# Patient Record
Sex: MALE | Race: OTHER
[De-identification: names, ages, dates, MRNs, and addresses within clinical notes are randomized per-mention and may not be internally consistent; named-entity substitution may affect disease eponyms.]

---

## 2018-08-25 ENCOUNTER — HOSPITAL ENCOUNTER (INPATIENT)
Dept: HOSPITAL 61 - ER | Age: 43
LOS: 5 days | Discharge: HOME | DRG: 871 | End: 2018-08-30
Attending: INTERNAL MEDICINE | Admitting: INTERNAL MEDICINE
Payer: MEDICARE

## 2018-08-25 VITALS — DIASTOLIC BLOOD PRESSURE: 63 MMHG | SYSTOLIC BLOOD PRESSURE: 101 MMHG

## 2018-08-25 VITALS — BODY MASS INDEX: 34.49 KG/M2 | HEIGHT: 64 IN | WEIGHT: 202.01 LBS

## 2018-08-25 VITALS — SYSTOLIC BLOOD PRESSURE: 97 MMHG | DIASTOLIC BLOOD PRESSURE: 73 MMHG

## 2018-08-25 VITALS — SYSTOLIC BLOOD PRESSURE: 113 MMHG | DIASTOLIC BLOOD PRESSURE: 76 MMHG

## 2018-08-25 VITALS — DIASTOLIC BLOOD PRESSURE: 69 MMHG | SYSTOLIC BLOOD PRESSURE: 103 MMHG

## 2018-08-25 VITALS — DIASTOLIC BLOOD PRESSURE: 64 MMHG | SYSTOLIC BLOOD PRESSURE: 119 MMHG

## 2018-08-25 VITALS — SYSTOLIC BLOOD PRESSURE: 106 MMHG | DIASTOLIC BLOOD PRESSURE: 63 MMHG

## 2018-08-25 VITALS — SYSTOLIC BLOOD PRESSURE: 106 MMHG | DIASTOLIC BLOOD PRESSURE: 74 MMHG

## 2018-08-25 VITALS — DIASTOLIC BLOOD PRESSURE: 90 MMHG | SYSTOLIC BLOOD PRESSURE: 119 MMHG

## 2018-08-25 VITALS — DIASTOLIC BLOOD PRESSURE: 76 MMHG | SYSTOLIC BLOOD PRESSURE: 120 MMHG

## 2018-08-25 DIAGNOSIS — F20.9: ICD-10-CM

## 2018-08-25 DIAGNOSIS — G92: ICD-10-CM

## 2018-08-25 DIAGNOSIS — E87.6: ICD-10-CM

## 2018-08-25 DIAGNOSIS — E11.65: ICD-10-CM

## 2018-08-25 DIAGNOSIS — F17.210: ICD-10-CM

## 2018-08-25 DIAGNOSIS — N17.9: ICD-10-CM

## 2018-08-25 DIAGNOSIS — G89.29: ICD-10-CM
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
## 2018-08-25 DIAGNOSIS — Q90.9: ICD-10-CM

## 2018-08-25 DIAGNOSIS — T67.5XXA: ICD-10-CM

## 2018-08-25 DIAGNOSIS — E83.42: ICD-10-CM

## 2018-08-25 DIAGNOSIS — A41.9: Primary | ICD-10-CM

## 2018-08-25 DIAGNOSIS — Z82.49: ICD-10-CM

## 2018-08-25 DIAGNOSIS — R50.9: ICD-10-CM

## 2018-08-25 DIAGNOSIS — Z79.4: ICD-10-CM

## 2018-08-25 DIAGNOSIS — F15.10: ICD-10-CM

## 2018-08-25 DIAGNOSIS — N17.0: ICD-10-CM

## 2018-08-25 DIAGNOSIS — E87.1: ICD-10-CM

## 2018-08-25 DIAGNOSIS — M62.82: ICD-10-CM

## 2018-08-25 LAB
% BANDS: 8 % (ref 0–9)
% LYMPHS: 4 % (ref 24–48)
% MONOS: 4 % (ref 0–10)
% SEGS: 84 % (ref 35–66)
ACETAMIN: < 2 MCG/ML (ref 10–30)
ALBUMIN SERPL-MCNC: 4.1 G/DL (ref 3.4–5)
ALBUMIN/GLOB SERPL: 1.2 {RATIO} (ref 1–1.7)
ALP SERPL-CCNC: 172 U/L (ref 46–116)
ALT SERPL-CCNC: 7 U/L (ref 16–63)
AMPHETAMINE/METHAMPHETAMINE: (no result)
ANION GAP SERPL CALC-SCNC: 16 MMOL/L (ref 6–14)
ANION GAP SERPL CALC-SCNC: 16 MMOL/L (ref 6–14)
APTT PPP: YELLOW S
AST SERPL-CCNC: 42 U/L (ref 15–37)
BACTERIA #/AREA URNS HPF: (no result) /HPF
BARBITURATES UR-MCNC: (no result) UG/ML
BASE EXCESS ABG: -9 MMOL/L (ref -3–3)
BASOPHILS # BLD AUTO: 0 X10^3/UL (ref 0–0.2)
BASOPHILS # BLD AUTO: 0 X10^3/UL (ref 0–0.2)
BASOPHILS NFR BLD: 0 % (ref 0–3)
BASOPHILS NFR BLD: 0 % (ref 0–3)
BENZODIAZ UR-MCNC: (no result) UG/L
BILIRUB SERPL-MCNC: 0.3 MG/DL (ref 0.2–1)
BILIRUB UR QL STRIP: (no result)
BUN SERPL-MCNC: 41 MG/DL (ref 8–26)
BUN SERPL-MCNC: 48 MG/DL (ref 8–26)
BUN/CREAT SERPL: 17 (ref 6–20)
CALCIUM SERPL-MCNC: 8.3 MG/DL (ref 8.5–10.1)
CALCIUM SERPL-MCNC: 9.3 MG/DL (ref 8.5–10.1)
CANNABINOIDS UR-MCNC: (no result) UG/L
CHLORIDE SERPL-SCNC: 103 MMOL/L (ref 98–107)
CHLORIDE SERPL-SCNC: 105 MMOL/L (ref 98–107)
CK SERPL-CCNC: 1170 U/L (ref 39–308)
CO2 SERPL-SCNC: 15 MMOL/L (ref 21–32)
CO2 SERPL-SCNC: 16 MMOL/L (ref 21–32)
COCAINE UR-MCNC: (no result) NG/ML
CREAT SERPL-MCNC: 1.7 MG/DL (ref 0.7–1.3)
CREAT SERPL-MCNC: 2.9 MG/DL (ref 0.7–1.3)
EOSINOPHIL NFR BLD: 0 % (ref 0–3)
EOSINOPHIL NFR BLD: 0 % (ref 0–3)
EOSINOPHIL NFR BLD: 0 X10^3/UL (ref 0–0.7)
EOSINOPHIL NFR BLD: 0 X10^3/UL (ref 0–0.7)
ERYTHROCYTE [DISTWIDTH] IN BLOOD BY AUTOMATED COUNT: 15.4 % (ref 11.5–14.5)
ERYTHROCYTE [DISTWIDTH] IN BLOOD BY AUTOMATED COUNT: 15.9 % (ref 11.5–14.5)
ETHANOL SERPL-MCNC: < 10 MG/DL (ref 0–10)
FIBRINOGEN PPP-MCNC: CLEAR MG/DL
GFR SERPLBLD BASED ON 1.73 SQ M-ARVRAT: 23.9 ML/MIN
GFR SERPLBLD BASED ON 1.73 SQ M-ARVRAT: 44.2 ML/MIN
GLOBULIN SER-MCNC: 3.4 G/DL (ref 2.2–3.8)
GLUCOSE SERPL-MCNC: 342 MG/DL (ref 70–99)
GLUCOSE SERPL-MCNC: 380 MG/DL (ref 70–99)
GRAN CASTS #/AREA URNS LPF: (no result) /HPF
HBA1C MFR BLD: 9.6 % (ref 4.8–5.6)
HCO3 BLDA-SCNC: 13 MMOL/L (ref 21–28)
HCT VFR BLD CALC: 33.8 % (ref 39–53)
HCT VFR BLD CALC: 38.6 % (ref 39–53)
HGB BLD-MCNC: 11.4 G/DL (ref 13–17.5)
HGB BLD-MCNC: 13.3 G/DL (ref 13–17.5)
HYALINE CASTS #/AREA URNS LPF: (no result) /HPF
INSPIRATION SETTING TIME VENT: 21
LYMPHOCYTES # BLD: 0.9 X10^3/UL (ref 1–4.8)
LYMPHOCYTES # BLD: 1 X10^3/UL (ref 1–4.8)
LYMPHOCYTES NFR BLD AUTO: 5 % (ref 24–48)
LYMPHOCYTES NFR BLD AUTO: 7 % (ref 24–48)
MAGNESIUM SERPL-MCNC: 1.7 MG/DL (ref 1.8–2.4)
MCH RBC QN AUTO: 28 PG (ref 25–35)
MCH RBC QN AUTO: 29 PG (ref 25–35)
MCHC RBC AUTO-ENTMCNC: 34 G/DL (ref 31–37)
MCHC RBC AUTO-ENTMCNC: 34 G/DL (ref 31–37)
MCV RBC AUTO: 83 FL (ref 79–100)
MCV RBC AUTO: 84 FL (ref 79–100)
METHADONE SERPL-MCNC: (no result) NG/ML
MONO #: 0.9 X10^3/UL (ref 0–1.1)
MONO #: 1.1 X10^3/UL (ref 0–1.1)
MONOCYTES NFR BLD: 6 % (ref 0–9)
MONOCYTES NFR BLD: 8 % (ref 0–9)
NEUT #: 12 X10^3UL (ref 1.8–7.7)
NEUT #: 14.6 X10^3UL (ref 1.8–7.7)
NEUTROPHILS NFR BLD AUTO: 85 % (ref 31–73)
NEUTROPHILS NFR BLD AUTO: 89 % (ref 31–73)
NITRITE UR QL STRIP: NEGATIVE
OPIATES UR-MCNC: (no result) NG/ML
PCO2 BLDA: 21 MMHG (ref 35–46)
PCP SERPL-MCNC: (no result) MG/DL
PH UR STRIP: 5 [PH]
PLATELET # BLD AUTO: 190 X10^3/UL (ref 140–400)
PLATELET # BLD AUTO: 250 X10^3/UL (ref 140–400)
PLATELET # BLD EST: ADEQUATE 10*3/UL
PO2 BLDA: 59 MMHG (ref 75–108)
POLYCHROMASIA BLD QL SMEAR: SLIGHT
POTASSIUM SERPL-SCNC: 3.4 MMOL/L (ref 3.5–5.1)
POTASSIUM SERPL-SCNC: 4.5 MMOL/L (ref 3.5–5.1)
PROT SERPL-MCNC: 7.5 G/DL (ref 6.4–8.2)
PROT UR STRIP-MCNC: >=300 MG/DL
PROTHROMBIN TIME: 14.5 SEC (ref 11.7–14)
RBC # BLD AUTO: 4.01 X10^6/UL (ref 4.3–5.7)
RBC # BLD AUTO: 4.63 X10^6/UL (ref 4.3–5.7)
SALIC: 6.6 MG/DL (ref 2.8–20)
SAO2 % BLDA: 89 % (ref 92–99)
SODIUM SERPL-SCNC: 134 MMOL/L (ref 136–145)
SODIUM SERPL-SCNC: 137 MMOL/L (ref 136–145)
SQUAMOUS #/AREA URNS LPF: (no result) /LPF
UROBILINOGEN UR-MCNC: 0.2 MG/DL
WBC # BLD AUTO: 14 X10^3/UL (ref 4–11)
WBC # BLD AUTO: 16.5 X10^3/UL (ref 4–11)
WBC #/AREA URNS HPF: (no result) /HPF (ref 0–4)

## 2018-08-25 PROCEDURE — 80053 COMPREHEN METABOLIC PANEL: CPT

## 2018-08-25 PROCEDURE — 36415 COLL VENOUS BLD VENIPUNCTURE: CPT

## 2018-08-25 PROCEDURE — 93005 ELECTROCARDIOGRAM TRACING: CPT

## 2018-08-25 PROCEDURE — 85610 PROTHROMBIN TIME: CPT

## 2018-08-25 PROCEDURE — 82553 CREATINE MB FRACTION: CPT

## 2018-08-25 PROCEDURE — G0479 DRUG TEST PRESUMP NOT OPT: HCPCS

## 2018-08-25 PROCEDURE — 82010 KETONE BODYS QUAN: CPT

## 2018-08-25 PROCEDURE — 80329 ANALGESICS NON-OPIOID 1 OR 2: CPT

## 2018-08-25 PROCEDURE — 82805 BLOOD GASES W/O2 SATURATION: CPT

## 2018-08-25 PROCEDURE — 80048 BASIC METABOLIC PNL TOTAL CA: CPT

## 2018-08-25 PROCEDURE — 87205 SMEAR GRAM STAIN: CPT

## 2018-08-25 PROCEDURE — 82565 ASSAY OF CREATININE: CPT

## 2018-08-25 PROCEDURE — 82550 ASSAY OF CK (CPK): CPT

## 2018-08-25 PROCEDURE — 87186 SC STD MICRODIL/AGAR DIL: CPT

## 2018-08-25 PROCEDURE — 36600 WITHDRAWAL OF ARTERIAL BLOOD: CPT

## 2018-08-25 PROCEDURE — 82962 GLUCOSE BLOOD TEST: CPT

## 2018-08-25 PROCEDURE — 85651 RBC SED RATE NONAUTOMATED: CPT

## 2018-08-25 PROCEDURE — 87641 MR-STAPH DNA AMP PROBE: CPT

## 2018-08-25 PROCEDURE — 85025 COMPLETE CBC W/AUTO DIFF WBC: CPT

## 2018-08-25 PROCEDURE — 83735 ASSAY OF MAGNESIUM: CPT

## 2018-08-25 PROCEDURE — 84484 ASSAY OF TROPONIN QUANT: CPT

## 2018-08-25 PROCEDURE — 96374 THER/PROPH/DIAG INJ IV PUSH: CPT

## 2018-08-25 PROCEDURE — G6039 ASSAY OF ACETAMINOPHEN: HCPCS

## 2018-08-25 PROCEDURE — 83036 HEMOGLOBIN GLYCOSYLATED A1C: CPT

## 2018-08-25 PROCEDURE — 80202 ASSAY OF VANCOMYCIN: CPT

## 2018-08-25 PROCEDURE — 70450 CT HEAD/BRAIN W/O DYE: CPT

## 2018-08-25 PROCEDURE — 80307 DRUG TEST PRSMV CHEM ANLYZR: CPT

## 2018-08-25 PROCEDURE — 87040 BLOOD CULTURE FOR BACTERIA: CPT

## 2018-08-25 PROCEDURE — 85007 BL SMEAR W/DIFF WBC COUNT: CPT

## 2018-08-25 PROCEDURE — 96361 HYDRATE IV INFUSION ADD-ON: CPT

## 2018-08-25 PROCEDURE — 83605 ASSAY OF LACTIC ACID: CPT

## 2018-08-25 PROCEDURE — 93306 TTE W/DOPPLER COMPLETE: CPT

## 2018-08-25 PROCEDURE — 96376 TX/PRO/DX INJ SAME DRUG ADON: CPT

## 2018-08-25 PROCEDURE — 81001 URINALYSIS AUTO W/SCOPE: CPT

## 2018-08-25 RX ADMIN — NICOTINE PRN PATCH: 21 PATCH, EXTENDED RELEASE TOPICAL at 12:43

## 2018-08-25 RX ADMIN — BACITRACIN SCH MLS/HR: 5000 INJECTION, POWDER, FOR SOLUTION INTRAMUSCULAR at 20:56

## 2018-08-25 RX ADMIN — INSULIN GLARGINE SCH UNITS: 100 INJECTION, SOLUTION SUBCUTANEOUS at 21:08

## 2018-08-25 RX ADMIN — INSULIN LISPRO SCH UNITS: 100 INJECTION, SOLUTION INTRAVENOUS; SUBCUTANEOUS at 17:23

## 2018-08-25 RX ADMIN — HEPARIN SODIUM SCH UNIT: 5000 INJECTION, SOLUTION INTRAVENOUS; SUBCUTANEOUS at 15:21

## 2018-08-25 RX ADMIN — HEPARIN SODIUM SCH UNIT: 5000 INJECTION, SOLUTION INTRAVENOUS; SUBCUTANEOUS at 22:08

## 2018-08-25 RX ADMIN — BACITRACIN SCH MLS/HR: 5000 INJECTION, POWDER, FOR SOLUTION INTRAMUSCULAR at 05:18

## 2018-08-25 RX ADMIN — BACITRACIN SCH MLS/HR: 5000 INJECTION, POWDER, FOR SOLUTION INTRAMUSCULAR at 07:35

## 2018-08-25 RX ADMIN — INSULIN LISPRO SCH UNITS: 100 INJECTION, SOLUTION INTRAVENOUS; SUBCUTANEOUS at 12:50

## 2018-08-25 RX ADMIN — BACITRACIN SCH MLS/HR: 5000 INJECTION, POWDER, FOR SOLUTION INTRAMUSCULAR at 13:18

## 2018-08-25 RX ADMIN — MORPHINE SULFATE PRN MG: 2 INJECTION, SOLUTION INTRAMUSCULAR; INTRAVENOUS at 21:02

## 2018-08-25 RX ADMIN — BACITRACIN SCH MLS/HR: 5000 INJECTION, POWDER, FOR SOLUTION INTRAMUSCULAR at 17:18

## 2018-08-25 NOTE — PHYS DOC
Past Medical History


Past Medical History:  Diabetes-Type II


Alcohol Use:  Occasionally


Drug Use:  Methamphetamine





Adult General


Chief Complaint


Chief Complaint:  SUBSTANCE ABUSE





HPI


HPI





Patient is a 42-year-old male who presents via EMS after reportedly being found 

out walking around for undetermined amount of time and very confused. Patient 

had admitted to EMS that he had used meth earlier this evening. EMS reports 

that patient confused and has temperature of greater than 105 tympanic. Patient 

denies any chest pain or shortness breath. He does admit to foot pain. Patient 

apparently has been walking around barefoot for undetermined period of time. 

Additional history is limited as patient is very poor historian.





Review of Systems


Review of Systems





Constitutional: Positive fever[]


Respiratory: Denies cough or shortness of breath []


Cardiovascular: Denies chest pain[]


GI: Denies abdominal pain []


Musculoskeletal: Complains of bilateral foot pain[]





A complete review of systems is limited due to patient being very poor 

historian.





Current Medications


Current Medications





Current Medications








 Medications


  (Trade)  Dose


 Ordered  Sig/Roxy  Start Time


 Stop Time Status Last Admin


Dose Admin


 


 Acetaminophen


  (Tylenol)  1,000 mg  1X  ONCE  8/25/18 05:00


 8/25/18 05:01 DC 8/25/18 05:00


1,000 MG


 


 Lorazepam


  (Ativan)  2 mg  1X  ONCE  8/25/18 05:00


 8/25/18 05:01 DC  





 


 Sodium Chloride  1,000 ml @ 


 1,000 mls/hr  Q1H  8/25/18 04:00


 8/25/18 04:59 DC 8/25/18 04:06


1,000 MLS/HR











Allergies


Allergies





Allergies








Coded Allergies Type Severity Reaction Last Updated Verified


 


  No Known Drug Allergies    8/25/18 No











Physical Exam


Physical Exam





Constitutional: Well developed, well nourished, no acute distress. []


HENT: Normocephalic, atraumatic, bilateral external ears normal, oropharynx dry

, no oral exudates, nose normal. []


Eyes: Pupils are dilated 4 mm, reactive to light. [] 


Neck: Normal range of motion, no tenderness, supple, no stridor. [] 


Cardiovascular: Markedly tachycardic rate with regular rhythm[]


Lungs & Thorax:  Bilateral breath sounds clear to auscultation []


Abdomen: Bowel sounds normal, soft, no tenderness. [] 


Skin: Warm, diaphoretic. There is significant sloughing of the epidermis/dermis 

to the plantar aspect of both feet. [] 


Extremities: ROM intact, no edema. [] 


Neurologic: Awake and alert. Normal motor function. Unable to fully assess 

neurological status as patient having difficulty with following instructions. []





Current Patient Data


Vital Signs





 Vital Signs








  Date Time  Temp Pulse Resp B/P (MAP) Pulse Ox O2 Delivery O2 Flow Rate FiO2


 


8/25/18 04:51 102.1       





 102.1       


 


8/25/18 04:10  142 20 108/59 (75) 97 Room Air  








Lab Values





 Laboratory Tests








Test


 8/25/18


03:45 8/25/18


03:55 8/25/18


04:07


 


Urine Collection Type Unknown    


 


Urine Color Yellow    


 


Urine Clarity Clear    


 


Urine pH 5.0    


 


Urine Specific Gravity 1.025    


 


Urine Protein


 >=300 mg/dL


(NEG-TRACE) 


 





 


Urine Glucose (UA)


 >=1000 mg/dL


(NEG) 


 





 


Urine Ketones (Stick)


 Negative mg/dL


(NEG) 


 





 


Urine Blood Trace (NEG)    


 


Urine Nitrite


 Negative (NEG)


 


 





 


Urine Bilirubin Small (NEG)    


 


Urine Urobilinogen Dipstick


 0.2 mg/dL (0.2


mg/dL) 


 





 


Urine Leukocyte Esterase


 Negative (NEG)


 


 





 


Urine RBC


 11-20 /HPF


(0-2) 


 





 


Urine WBC


 1-4 /HPF (0-4)


 


 





 


Urine Squamous Epithelial


Cells Few /LPF  


 


 





 


Urine Bacteria


 Few /HPF


(0-FEW) 


 





 


Urine Hyaline Casts Moderate /HPF    


 


Urine Granular Casts Moderate /HPF    


 


Urine Mucus Slight /LPF    


 


Urine Opiates Screen Neg (NEG)    


 


Urine Methadone Screen Neg (NEG)    


 


Urine Barbiturates Neg (NEG)    


 


Urine Phencyclidine Screen Neg (NEG)    


 


Urine


Amphetamine/Methamphetamine Pos (NEG)  


 


 





 


Urine Benzodiazepines Screen Neg (NEG)    


 


Urine Cocaine Screen Neg (NEG)    


 


Urine Cannabinoids Screen Pos (NEG)    


 


Urine Ethyl Alcohol Neg (NEG)    


 


Glucose (Fingerstick)


 


 367 mg/dL


(70-99)  H 





 


White Blood Count


 


 


 16.5 x10^3/uL


(4.0-11.0)  H


 


Red Blood Count


 


 


 4.63 x10^6/uL


(4.30-5.70)


 


Hemoglobin


 


 


 13.3 g/dL


(13.0-17.5)


 


Hematocrit


 


 


 38.6 %


(39.0-53.0)  L


 


Mean Corpuscular Volume


 


 


 83 fL ()





 


Mean Corpuscular Hemoglobin   29 pg (25-35)  


 


Mean Corpuscular Hemoglobin


Concent 


 


 34 g/dL


(31-37)


 


Red Cell Distribution Width


 


 


 15.4 %


(11.5-14.5)  H


 


Platelet Count


 


 


 250 x10^3/uL


(140-400)


 


Neutrophils (%) (Auto)   89 % (31-73)  H


 


Lymphocytes (%) (Auto)   5 % (24-48)  L


 


Monocytes (%) (Auto)   6 % (0-9)  


 


Eosinophils (%) (Auto)   0 % (0-3)  


 


Basophils (%) (Auto)   0 % (0-3)  


 


Neutrophils # (Auto)


 


 


 14.6 x10^3uL


(1.8-7.7)  H


 


Lymphocytes # (Auto)


 


 


 0.9 x10^3/uL


(1.0-4.8)  L


 


Monocytes # (Auto)


 


 


 0.9 x10^3/uL


(0.0-1.1)


 


Eosinophils # (Auto)


 


 


 0.0 x10^3/uL


(0.0-0.7)


 


Basophils # (Auto)


 


 


 0.0 x10^3/uL


(0.0-0.2)


 


Platelet Estimate   Pending  


 


Prothrombin Time


 


 


 14.5 SEC


(11.7-14.0)  H


 


Prothrombin Time INR


 


 


 1.2 (0.8-1.1)


H


 


Sodium Level


 


 


 137 mmol/L


(136-145)


 


Potassium Level


 


 


 4.5 mmol/L


(3.5-5.1)


 


Chloride Level


 


 


 105 mmol/L


()


 


Carbon Dioxide Level


 


 


 16 mmol/L


(21-32)  L


 


Anion Gap   16 (6-14)  H


 


Blood Urea Nitrogen


 


 


 48 mg/dL


(8-26)  H


 


Creatinine


 


 


 2.9 mg/dL


(0.7-1.3)  H


 


Estimated GFR


(Cockcroft-Gault) 


 


 23.9  





 


BUN/Creatinine Ratio   17 (6-20)  


 


Glucose Level


 


 


 380 mg/dL


(70-99)  H


 


Calcium Level


 


 


 9.3 mg/dL


(8.5-10.1)


 


Magnesium Level


 


 


 1.7 mg/dL


(1.8-2.4)  L


 


Total Bilirubin


 


 


 0.3 mg/dL


(0.2-1.0)


 


Aspartate Amino Transferase


(AST) 


 


 42 U/L (15-37)


H


 


Alanine Aminotransferase (ALT)


 


 


 7 U/L (16-63)


L


 


Alkaline Phosphatase


 


 


 172 U/L


()  H


 


Creatine Kinase


 


 


 1055 U/L


()  H


 


Total Protein


 


 


 7.5 g/dL


(6.4-8.2)


 


Albumin


 


 


 4.1 g/dL


(3.4-5.0)


 


Albumin/Globulin Ratio   1.2 (1.0-1.7)  


 


Salicylates Level


 


 


 6.6 mg/dL


(2.8-20.0)


 


Salicylate Last Dose Date   unknown  


 


Salicylate Last Dose Time   unknown  


 


Acetaminophen Level


 


 


 < 2.0 mcg/ml


(10-30)  L


 


Acetaminophen Last Dose Date   unknown  


 


Acetaminophen Last Dose Time   unknown  


 


Ethyl Alcohol Level


 


 


 < 10 mg/dL


(0-10)


 


Acetone Level   Pending  





 Laboratory Tests


8/25/18 04:07








 Laboratory Tests


8/25/18 04:07














EKG


EKG


[]


Interpretation Time:


EKG demonstrates sinus tachycardia with rate of 140.





Radiology/Procedures


Radiology/Procedures


[]





Course & Med Decision Making


Course & Med Decision Making


Pertinent Labs and Imaging studies reviewed. (See chart for details)





After arrival, rectal temp was obtained and was found to be 106.1. Cooling 

measures were initiated with ice packs. Patient given normal saline bolus 

patient also given Tylenol for fever. At 5:00 AM, repeat body temperature was 

102.1.





Dragon Disclaimer


Dragon Disclaimer


This electronic medical record was generated, in whole or in part, using a 

voice recognition dictation system.





Departure


Departure


Impression:  


 Primary Impression:  


 Heat exhaustion


 Additional Impressions:  


 Methamphetamine abuse


 Fever 106 degrees F or over


 Acute renal failure


Disposition:  09 ADMITTED AS INPATIENT


Admitting Physician:  Iman Gutierrez


Condition:  IMPROVED





Problem Qualifiers








 Primary Impression:  


 Heat exhaustion


 Encounter type:  initial encounter  Qualified Codes:  T67.5XXA - Heat 

exhaustion, unspecified, initial encounter


 Additional Impressions:  


 Acute renal failure


 Acute renal failure type:  unspecified  Qualified Codes:  N17.9 - Acute kidney 

failure, unspecified








CHICA PATEL Jr. DO Aug 25, 2018 04:35

## 2018-08-25 NOTE — PDOC1
History and Physical


Date of Admission


Date of Admission


8/25/18





Identification/Chief Complaint


Chief Complaint


AMS





Source


Source:  Chart review, Patient





History of Present Illness


History of Present Illness











Patient is a 42-year-old male who presents via EMS after reportedly being found 

out walking around for undetermined amount of time and very confused Yesterday.


pt was admitted to ICU, now he is awake , but the way he talks is not normal, 

as per nurse, pt has down syndrome and schizophrenia, the this is close to his 

baseline.


Pt remembered he took meth yesterday, but said he only took it once a year. He 

was found T 105, LEV, rhabdomyelisis. admitted in ICU with IVF.


He denies cough, sob, chest pain N/V. 


he has bl foot pain, said he fell, but cannot tell me details, bl sole skin 

tear. 


said use insulin for dm at home, also cannot tell the dose.





Past Medical History


Endocrine:  Diabetes





Past Surgical History


Past Surgical History:  No pertinent history





Family History


Family History:  Hypertension





Social History


Smoke:  1 pack per day


ALCOHOL:  rare


Drugs:  Crystal meth





Current Problem List


Problem List


Problems


Medical Problems:


(1) Acute renal failure


Status: Acute  





(2) Fever 106 degrees F or over


Status: Acute  





(3) Heat exhaustion


Status: Acute  





(4) Methamphetamine abuse


Status: Acute  











Current Medications


Current Medications





Current Medications








 Medications


  (Trade)  Dose


 Ordered  Sig/Roxy  Start Time


 Stop Time Status Last Admin


Dose Admin


 


 Acetaminophen


  (Tylenol)  1,000 mg  1X  ONCE  8/25/18 05:00


 8/25/18 05:01 DC 8/25/18 05:00


1,000 MG


 


 Dextrose


  (Dextrose


 50%-Water Syringe)  12.5 gm  PRN Q15MIN  PRN  8/25/18 08:15


     





 


 Insulin Human


 Lispro


  (HumaLOG)  0-9 UNITS  TIDWMEALS  8/25/18 12:00


    8/25/18 12:50


5 UNITS


 


 Lorazepam


  (Ativan)  2 mg  1X  ONCE  8/25/18 05:00


 8/25/18 05:01 DC  





 


 Nicotine


  (Nicoderm Cq


 21mg)  1 patch  PRN DAILY  PRN  8/25/18 11:30


    8/25/18 12:43


1 PATCH


 


 Ondansetron HCl


  (Zofran)  4 mg  PRN Q8HRS  PRN  8/25/18 05:30


 8/26/18 05:29   





 


 Potassium Chloride


  (Klor-Con)  40 meq  1X  ONCE  8/25/18 12:00


 8/25/18 12:01 DC 8/25/18 12:43


40 MEQ


 


 Sodium Chloride  1,000 ml @ 


 1,000 mls/hr  1X  ONCE  8/25/18 08:15


 8/25/18 09:14 DC 8/25/18 08:15


1,000 MLS/HR











Allergies


Allergies





Allergies








Coded Allergies Type Severity Reaction Last Updated Verified


 


  No Known Drug Allergies    8/25/18 No











ROS


Review of System


CONSTITUTIONAL:        No fever or chills


EYES:                          No recent changes


SKIN:               No rash or itching


CARDIOVASCULAR:     No chest pain, syncope, palpitations, or edema


RESPIRATORY:            No SOB or cough


GASTROINTESTINAL:    No nausea, vomiting or abdominal pain


NEUROLOGICAL:          No headaches or weakness


ENDOCRINE:               No cold or heat intolerance


GENITOURINARY:         No urgency or frequency of urination


MUSCULOSKELETAL:   No back pain or joint pain


LYMPHATICS:               No enlarged lymph nodes


PSYCHIATRIC:              No anxiety or depression





Physical Exam


Physical Exam


GEN.:    No apparent distress.  Alert and oriented x3, talks slow and not 

answer questions well. 


HEENT:    Head is normocephalic, atraumatic


NECK:    Supple.  


LUNGS:    Clear to auscultation.


HEART:    RRR, S1, S2 present.  Peripheral pulses intact


ABDOMEN:    Soft, nontender.  Positive bowel sounds.


EXTREMITIES:    Without any cyanosis.    


NEUROLOGIC:     Normal speech, normal tone


PSYCHIATRIC:    Normal affect, normal mood.


SKIN:   bl sole skin tear.





Vitals


Vitals





Vital Signs








  Date Time  Temp Pulse Resp B/P (MAP) Pulse Ox O2 Delivery O2 Flow Rate FiO2


 


8/25/18 11:52 98.1 96 18 119/90 (100) 95 Room Air  





 98.1       


 


8/25/18 06:45       2.0 











Labs


Labs





Laboratory Tests








Test


 8/25/18


03:45 8/25/18


03:55 8/25/18


04:07 8/25/18


05:33


 


Urine Collection Type Unknown    


 


Urine Color Yellow    


 


Urine Clarity Clear    


 


Urine pH 5.0    


 


Urine Specific Gravity 1.025    


 


Urine Protein


 >=300 mg/dL


(NEG-TRACE) 


 


 





 


Urine Glucose (UA)


 >=1000 mg/dL


(NEG) 


 


 





 


Urine Ketones (Stick)


 Negative mg/dL


(NEG) 


 


 





 


Urine Blood Trace (NEG)    


 


Urine Nitrite Negative (NEG)    


 


Urine Bilirubin Small (NEG)    


 


Urine Urobilinogen Dipstick


 0.2 mg/dL (0.2


mg/dL) 


 


 





 


Urine Leukocyte Esterase Negative (NEG)    


 


Urine RBC


 11-20 /HPF


(0-2) 


 


 





 


Urine WBC 1-4 /HPF (0-4)    


 


Urine Squamous Epithelial


Cells Few /LPF 


 


 


 





 


Urine Bacteria


 Few /HPF


(0-FEW) 


 


 





 


Urine Hyaline Casts Moderate /HPF    


 


Urine Granular Casts Moderate /HPF    


 


Urine Mucus Slight /LPF    


 


Urine Opiates Screen Neg (NEG)    


 


Urine Methadone Screen Neg (NEG)    


 


Urine Barbiturates Neg (NEG)    


 


Urine Phencyclidine Screen Neg (NEG)    


 


Urine


Amphetamine/Methamphetamine Pos (NEG) 


 


 


 





 


Urine Benzodiazepines Screen Neg (NEG)    


 


Urine Cocaine Screen Neg (NEG)    


 


Urine Cannabinoids Screen Pos (NEG)    


 


Urine Ethyl Alcohol Neg (NEG)    


 


Glucose (Fingerstick)


 


 367 mg/dL


(70-99) 


 





 


White Blood Count


 


 


 16.5 x10^3/uL


(4.0-11.0) 





 


Red Blood Count


 


 


 4.63 x10^6/uL


(4.30-5.70) 





 


Hemoglobin


 


 


 13.3 g/dL


(13.0-17.5) 





 


Hematocrit


 


 


 38.6 %


(39.0-53.0) 





 


Mean Corpuscular Volume   83 fL ()  


 


Mean Corpuscular Hemoglobin   29 pg (25-35)  


 


Mean Corpuscular Hemoglobin


Concent 


 


 34 g/dL


(31-37) 





 


Red Cell Distribution Width


 


 


 15.4 %


(11.5-14.5) 





 


Platelet Count


 


 


 250 x10^3/uL


(140-400) 





 


Neutrophils (%) (Auto)   89 % (31-73)  


 


Lymphocytes (%) (Auto)   5 % (24-48)  


 


Monocytes (%) (Auto)   6 % (0-9)  


 


Eosinophils (%) (Auto)   0 % (0-3)  


 


Basophils (%) (Auto)   0 % (0-3)  


 


Neutrophils # (Auto)


 


 


 14.6 x10^3uL


(1.8-7.7) 





 


Lymphocytes # (Auto)


 


 


 0.9 x10^3/uL


(1.0-4.8) 





 


Monocytes # (Auto)


 


 


 0.9 x10^3/uL


(0.0-1.1) 





 


Eosinophils # (Auto)


 


 


 0.0 x10^3/uL


(0.0-0.7) 





 


Basophils # (Auto)


 


 


 0.0 x10^3/uL


(0.0-0.2) 





 


Segmented Neutrophils %   84 % (35-66)  


 


Band Neutrophils %   8 % (0-9)  


 


Lymphocytes %   4 % (24-48)  


 


Monocytes %   4 % (0-10)  


 


Platelet Estimate


 


 


 Adequate


(ADEQUATE) 





 


Large Platelets   Few  


 


Polychromasia   Slight  


 


Prothrombin Time


 


 


 14.5 SEC


(11.7-14.0) 





 


Prothromb Time International


Ratio 


 


 1.2 (0.8-1.1) 


 





 


Sodium Level


 


 


 137 mmol/L


(136-145) 





 


Potassium Level


 


 


 4.5 mmol/L


(3.5-5.1) 





 


Chloride Level


 


 


 105 mmol/L


() 





 


Carbon Dioxide Level


 


 


 16 mmol/L


(21-32) 





 


Anion Gap   16 (6-14)  


 


Blood Urea Nitrogen


 


 


 48 mg/dL


(8-26) 





 


Creatinine


 


 


 2.9 mg/dL


(0.7-1.3) 





 


Estimated GFR


(Cockcroft-Gault) 


 


 23.9 


 





 


BUN/Creatinine Ratio   17 (6-20)  


 


Glucose Level


 


 


 380 mg/dL


(70-99) 





 


Lactic Acid Level


 


 


 4.4 mmol/L


(0.4-2.0) 





 


Calcium Level


 


 


 9.3 mg/dL


(8.5-10.1) 





 


Magnesium Level


 


 


 1.7 mg/dL


(1.8-2.4) 





 


Total Bilirubin


 


 


 0.3 mg/dL


(0.2-1.0) 





 


Aspartate Amino Transf


(AST/SGOT) 


 


 42 U/L (15-37) 


 





 


Alanine Aminotransferase


(ALT/SGPT) 


 


 7 U/L (16-63) 


 





 


Alkaline Phosphatase


 


 


 172 U/L


() 





 


Creatine Kinase


 


 


 1170 U/L


() 





 


Creatine Kinase MB (Mass)


 


 


 5.3 ng/mL


(0.0-3.6) 





 


Creatine Kinase MB Relative


Index 


 


 0.5 % (0-4) 


 





 


Troponin I Quantitative


 


 


 0.400 ng/mL


(0.000-0.055) 





 


Total Protein


 


 


 7.5 g/dL


(6.4-8.2) 





 


Albumin


 


 


 4.1 g/dL


(3.4-5.0) 





 


Albumin/Globulin Ratio   1.2 (1.0-1.7)  


 


Salicylates Level


 


 


 6.6 mg/dL


(2.8-20.0) 





 


Salicylate Last Dose Date   unknown  


 


Salicylate Last Dose Time   unknown  


 


Acetaminophen Level


 


 


 < 2.0 mcg/ml


(10-30) 





 


Acetaminophen Last Dose Date   unknown  


 


Acetaminophen Last Dose Time   unknown  


 


Ethyl Alcohol Level


 


 


 < 10 mg/dL


(0-10) 





 


Acetone Level   Neg (NEG)  


 


O2 Saturation    89 % (92-99) 


 


Arterial Blood pH


 


 


 


 7.42


(7.35-7.45)


 


Arterial Blood pCO2 at


Patient Temp 


 


 


 21 mmHg


(35-46)


 


Arterial Blood pO2 at Patient


Temp 


 


 


 59 mmHg


()


 


Arterial Blood HCO3


 


 


 


 13 mmol/L


(21-28)


 


Arterial Blood Base Excess


 


 


 


 -9 mmol/L


(-3-3)


 


FiO2    21.0 


 


Test


 8/25/18


10:55 8/25/18


12:02 


 





 


White Blood Count


 14.0 x10^3/uL


(4.0-11.0) 


 


 





 


Red Blood Count


 4.01 x10^6/uL


(4.30-5.70) 


 


 





 


Hemoglobin


 11.4 g/dL


(13.0-17.5) 


 


 





 


Hematocrit


 33.8 %


(39.0-53.0) 


 


 





 


Mean Corpuscular Volume 84 fL ()    


 


Mean Corpuscular Hemoglobin 28 pg (25-35)    


 


Mean Corpuscular Hemoglobin


Concent 34 g/dL


(31-37) 


 


 





 


Red Cell Distribution Width


 15.9 %


(11.5-14.5) 


 


 





 


Platelet Count


 190 x10^3/uL


(140-400) 


 


 





 


Neutrophils (%) (Auto) 85 % (31-73)    


 


Lymphocytes (%) (Auto) 7 % (24-48)    


 


Monocytes (%) (Auto) 8 % (0-9)    


 


Eosinophils (%) (Auto) 0 % (0-3)    


 


Basophils (%) (Auto) 0 % (0-3)    


 


Neutrophils # (Auto)


 12.0 x10^3uL


(1.8-7.7) 


 


 





 


Lymphocytes # (Auto)


 1.0 x10^3/uL


(1.0-4.8) 


 


 





 


Monocytes # (Auto)


 1.1 x10^3/uL


(0.0-1.1) 


 


 





 


Eosinophils # (Auto)


 0.0 x10^3/uL


(0.0-0.7) 


 


 





 


Basophils # (Auto)


 0.0 x10^3/uL


(0.0-0.2) 


 


 





 


Sodium Level


 134 mmol/L


(136-145) 


 


 





 


Potassium Level


 3.4 mmol/L


(3.5-5.1) 


 


 





 


Chloride Level


 103 mmol/L


() 


 


 





 


Carbon Dioxide Level


 15 mmol/L


(21-32) 


 


 





 


Anion Gap 16 (6-14)    


 


Blood Urea Nitrogen


 41 mg/dL


(8-26) 


 


 





 


Creatinine


 1.7 mg/dL


(0.7-1.3) 


 


 





 


Estimated GFR


(Cockcroft-Gault) 44.2 


 


 


 





 


Glucose Level


 342 mg/dL


(70-99) 


 


 





 


Lactic Acid Level


 2.2 mmol/L


(0.4-2.0) 


 


 





 


Calcium Level


 8.3 mg/dL


(8.5-10.1) 


 


 





 


Glucose (Fingerstick)


 


 281 mg/dL


(70-99) 


 











Laboratory Tests








Test


 8/25/18


03:45 8/25/18


03:55 8/25/18


04:07 8/25/18


05:33


 


Urine Collection Type Unknown    


 


Urine Color Yellow    


 


Urine Clarity Clear    


 


Urine pH 5.0    


 


Urine Specific Gravity 1.025    


 


Urine Protein


 >=300 mg/dL


(NEG-TRACE) 


 


 





 


Urine Glucose (UA)


 >=1000 mg/dL


(NEG) 


 


 





 


Urine Ketones (Stick)


 Negative mg/dL


(NEG) 


 


 





 


Urine Blood Trace (NEG)    


 


Urine Nitrite Negative (NEG)    


 


Urine Bilirubin Small (NEG)    


 


Urine Urobilinogen Dipstick


 0.2 mg/dL (0.2


mg/dL) 


 


 





 


Urine Leukocyte Esterase Negative (NEG)    


 


Urine RBC


 11-20 /HPF


(0-2) 


 


 





 


Urine WBC 1-4 /HPF (0-4)    


 


Urine Squamous Epithelial


Cells Few /LPF 


 


 


 





 


Urine Bacteria


 Few /HPF


(0-FEW) 


 


 





 


Urine Hyaline Casts Moderate /HPF    


 


Urine Granular Casts Moderate /HPF    


 


Urine Mucus Slight /LPF    


 


Urine Opiates Screen Neg (NEG)    


 


Urine Methadone Screen Neg (NEG)    


 


Urine Barbiturates Neg (NEG)    


 


Urine Phencyclidine Screen Neg (NEG)    


 


Urine


Amphetamine/Methamphetamine Pos (NEG) 


 


 


 





 


Urine Benzodiazepines Screen Neg (NEG)    


 


Urine Cocaine Screen Neg (NEG)    


 


Urine Cannabinoids Screen Pos (NEG)    


 


Urine Ethyl Alcohol Neg (NEG)    


 


Glucose (Fingerstick)


 


 367 mg/dL


(70-99) 


 





 


White Blood Count


 


 


 16.5 x10^3/uL


(4.0-11.0) 





 


Red Blood Count


 


 


 4.63 x10^6/uL


(4.30-5.70) 





 


Hemoglobin


 


 


 13.3 g/dL


(13.0-17.5) 





 


Hematocrit


 


 


 38.6 %


(39.0-53.0) 





 


Mean Corpuscular Volume   83 fL ()  


 


Mean Corpuscular Hemoglobin   29 pg (25-35)  


 


Mean Corpuscular Hemoglobin


Concent 


 


 34 g/dL


(31-37) 





 


Red Cell Distribution Width


 


 


 15.4 %


(11.5-14.5) 





 


Platelet Count


 


 


 250 x10^3/uL


(140-400) 





 


Neutrophils (%) (Auto)   89 % (31-73)  


 


Lymphocytes (%) (Auto)   5 % (24-48)  


 


Monocytes (%) (Auto)   6 % (0-9)  


 


Eosinophils (%) (Auto)   0 % (0-3)  


 


Basophils (%) (Auto)   0 % (0-3)  


 


Neutrophils # (Auto)


 


 


 14.6 x10^3uL


(1.8-7.7) 





 


Lymphocytes # (Auto)


 


 


 0.9 x10^3/uL


(1.0-4.8) 





 


Monocytes # (Auto)


 


 


 0.9 x10^3/uL


(0.0-1.1) 





 


Eosinophils # (Auto)


 


 


 0.0 x10^3/uL


(0.0-0.7) 





 


Basophils # (Auto)


 


 


 0.0 x10^3/uL


(0.0-0.2) 





 


Segmented Neutrophils %   84 % (35-66)  


 


Band Neutrophils %   8 % (0-9)  


 


Lymphocytes %   4 % (24-48)  


 


Monocytes %   4 % (0-10)  


 


Platelet Estimate


 


 


 Adequate


(ADEQUATE) 





 


Large Platelets   Few  


 


Polychromasia   Slight  


 


Prothrombin Time


 


 


 14.5 SEC


(11.7-14.0) 





 


Prothromb Time International


Ratio 


 


 1.2 (0.8-1.1) 


 





 


Sodium Level


 


 


 137 mmol/L


(136-145) 





 


Potassium Level


 


 


 4.5 mmol/L


(3.5-5.1) 





 


Chloride Level


 


 


 105 mmol/L


() 





 


Carbon Dioxide Level


 


 


 16 mmol/L


(21-32) 





 


Anion Gap   16 (6-14)  


 


Blood Urea Nitrogen


 


 


 48 mg/dL


(8-26) 





 


Creatinine


 


 


 2.9 mg/dL


(0.7-1.3) 





 


Estimated GFR


(Cockcroft-Gault) 


 


 23.9 


 





 


BUN/Creatinine Ratio   17 (6-20)  


 


Glucose Level


 


 


 380 mg/dL


(70-99) 





 


Lactic Acid Level


 


 


 4.4 mmol/L


(0.4-2.0) 





 


Calcium Level


 


 


 9.3 mg/dL


(8.5-10.1) 





 


Magnesium Level


 


 


 1.7 mg/dL


(1.8-2.4) 





 


Total Bilirubin


 


 


 0.3 mg/dL


(0.2-1.0) 





 


Aspartate Amino Transf


(AST/SGOT) 


 


 42 U/L (15-37) 


 





 


Alanine Aminotransferase


(ALT/SGPT) 


 


 7 U/L (16-63) 


 





 


Alkaline Phosphatase


 


 


 172 U/L


() 





 


Creatine Kinase


 


 


 1170 U/L


() 





 


Creatine Kinase MB (Mass)


 


 


 5.3 ng/mL


(0.0-3.6) 





 


Creatine Kinase MB Relative


Index 


 


 0.5 % (0-4) 


 





 


Troponin I Quantitative


 


 


 0.400 ng/mL


(0.000-0.055) 





 


Total Protein


 


 


 7.5 g/dL


(6.4-8.2) 





 


Albumin


 


 


 4.1 g/dL


(3.4-5.0) 





 


Albumin/Globulin Ratio   1.2 (1.0-1.7)  


 


Salicylates Level


 


 


 6.6 mg/dL


(2.8-20.0) 





 


Salicylate Last Dose Date   unknown  


 


Salicylate Last Dose Time   unknown  


 


Acetaminophen Level


 


 


 < 2.0 mcg/ml


(10-30) 





 


Acetaminophen Last Dose Date   unknown  


 


Acetaminophen Last Dose Time   unknown  


 


Ethyl Alcohol Level


 


 


 < 10 mg/dL


(0-10) 





 


Acetone Level   Neg (NEG)  


 


O2 Saturation    89 % (92-99) 


 


Arterial Blood pH


 


 


 


 7.42


(7.35-7.45)


 


Arterial Blood pCO2 at


Patient Temp 


 


 


 21 mmHg


(35-46)


 


Arterial Blood pO2 at Patient


Temp 


 


 


 59 mmHg


()


 


Arterial Blood HCO3


 


 


 


 13 mmol/L


(21-28)


 


Arterial Blood Base Excess


 


 


 


 -9 mmol/L


(-3-3)


 


FiO2    21.0 


 


Test


 8/25/18


10:55 8/25/18


12:02 


 





 


White Blood Count


 14.0 x10^3/uL


(4.0-11.0) 


 


 





 


Red Blood Count


 4.01 x10^6/uL


(4.30-5.70) 


 


 





 


Hemoglobin


 11.4 g/dL


(13.0-17.5) 


 


 





 


Hematocrit


 33.8 %


(39.0-53.0) 


 


 





 


Mean Corpuscular Volume 84 fL ()    


 


Mean Corpuscular Hemoglobin 28 pg (25-35)    


 


Mean Corpuscular Hemoglobin


Concent 34 g/dL


(31-37) 


 


 





 


Red Cell Distribution Width


 15.9 %


(11.5-14.5) 


 


 





 


Platelet Count


 190 x10^3/uL


(140-400) 


 


 





 


Neutrophils (%) (Auto) 85 % (31-73)    


 


Lymphocytes (%) (Auto) 7 % (24-48)    


 


Monocytes (%) (Auto) 8 % (0-9)    


 


Eosinophils (%) (Auto) 0 % (0-3)    


 


Basophils (%) (Auto) 0 % (0-3)    


 


Neutrophils # (Auto)


 12.0 x10^3uL


(1.8-7.7) 


 


 





 


Lymphocytes # (Auto)


 1.0 x10^3/uL


(1.0-4.8) 


 


 





 


Monocytes # (Auto)


 1.1 x10^3/uL


(0.0-1.1) 


 


 





 


Eosinophils # (Auto)


 0.0 x10^3/uL


(0.0-0.7) 


 


 





 


Basophils # (Auto)


 0.0 x10^3/uL


(0.0-0.2) 


 


 





 


Sodium Level


 134 mmol/L


(136-145) 


 


 





 


Potassium Level


 3.4 mmol/L


(3.5-5.1) 


 


 





 


Chloride Level


 103 mmol/L


() 


 


 





 


Carbon Dioxide Level


 15 mmol/L


(21-32) 


 


 





 


Anion Gap 16 (6-14)    


 


Blood Urea Nitrogen


 41 mg/dL


(8-26) 


 


 





 


Creatinine


 1.7 mg/dL


(0.7-1.3) 


 


 





 


Estimated GFR


(Cockcroft-Gault) 44.2 


 


 


 





 


Glucose Level


 342 mg/dL


(70-99) 


 


 





 


Lactic Acid Level


 2.2 mmol/L


(0.4-2.0) 


 


 





 


Calcium Level


 8.3 mg/dL


(8.5-10.1) 


 


 





 


Glucose (Fingerstick)


 


 281 mg/dL


(70-99) 


 














VTE Prophylaxis Ordered


VTE Prophylaxis Devices:  Yes


VTE Pharmacological Prophylaxi:  Yes





Assessment/Plan


Assessment/Plan








AMS, metabolic and toxic encephalopathy


drug abuse with meth


hyperthermia 


LEV vasomotor


DM2 on insulin


rhabdomyolysis


elevated CE with lev, demanding ischemia


tobaccoism


leukocytosis, SIRS


LACTATE acidosis


hypomagnesemia


down syndrome


schizophrenia





plan:


transfer out of ICU


card consult, check echo


cycle CE


need verify home meds


ada diet, lantus 20u qhs, ssi for now


cont ivf for today, 2l NS bolus


labs tmr


nicotine patch prn 


replete Mag


fu bcx, no abx for now











NIKHIL KLINE MD Aug 25, 2018 13:24

## 2018-08-25 NOTE — PDOC2
CARDIOLOGY CONSULT NOTE


CHEIF COMPLAINT:


Confusion





HPI:


42 y.o male with polysubstance abuse presented with LEV and elevated troponin 

noted in the setting of elevated lactic acid. EKG w/o acute findings. No active 

anginal symptoms. Limited history.





PMHX:


No prior significant cardiac history





SOCHX:


As above





FAMHX:


NC





CURRENT MEDS:





Current Medications








 Medications


  (Trade)  Dose


 Ordered  Sig/Roxy  Start Time


 Stop Time Status Last Admin


Dose Admin


 


 Acetaminophen


  (Tylenol)  1,000 mg  1X  ONCE  8/25/18 05:00


 8/25/18 05:01 DC 8/25/18 05:00


1,000 MG


 


 Dextrose


  (Dextrose


 50%-Water Syringe)  12.5 gm  PRN Q15MIN  PRN  8/25/18 08:15


     





 


 Insulin Human


 Lispro


  (HumaLOG)  0-9 UNITS  TIDWMEALS  8/25/18 12:00


     





 


 Lorazepam


  (Ativan)  2 mg  1X  ONCE  8/25/18 05:00


 8/25/18 05:01 DC  





 


 Nicotine


  (Nicoderm Cq


 21mg)  1 patch  PRN DAILY  PRN  8/25/18 11:30


     





 


 Ondansetron HCl


  (Zofran)  4 mg  PRN Q8HRS  PRN  8/25/18 05:30


 8/26/18 05:29   





 


 Potassium Chloride


  (Klor-Con)  40 meq  1X  ONCE  8/25/18 12:00


 8/25/18 12:01 DC  





 


 Sodium Chloride  1,000 ml @ 


 1,000 mls/hr  1X  ONCE  8/25/18 08:15


 8/25/18 09:14 DC 8/25/18 08:15


1,000 MLS/HR











ALLERGIES:





Allergies








Coded Allergies Type Severity Reaction Last Updated Verified


 


  No Known Drug Allergies    8/25/18 No











ROS:


Unable to be accurately obtained due to recent confusion





PHYSICAL EXAM:


Vital Signs:





Vital Signs








  Date Time  Temp Pulse Resp B/P (MAP) Pulse Ox O2 Delivery O2 Flow Rate FiO2


 


8/25/18 11:52 98.1 96 18 119/90 (100) 95 Room Air  





 98.1       


 


8/25/18 06:45       2.0 








I & O











Intake and Output 


 


 8/25/18





 07:00


 


Intake Total 1000 ml


 


Balance 1000 ml


 


 


 


IV Total 1000 ml








Physical Exam:


GEN.:    No apparent distress.  Alert and oriented.


HEENT:    Head is normocephalic, atraumatic


NECK:    Supple.  


LUNGS:    Clear to auscultation.


HEART:    RRR, S1, S2 present.  Peripheral pulses intact


ABDOMEN:    Soft, nontender.  Positive bowel sounds.


EXTREMITIES:    Without any cyanosis.    


PSYCHIATRIC:    Normal affect, normal mood.


SKIN:   No ulcerations





DIAGNOSTIC TESTING:


Labs noted. 


EKG unremarkle.





ASSESSMENT:


1. Elevated troponin in the setting of fever, LEV and lactic acidosis. No clear 

cardiac symptoms.





PLAN:


1. Check echo 


2. Supportive care. 


3. Most issues related to his substance abuse, if echo wnl, then conservative 

mgmt, if echo abn, will then consider further w/u. 





Thanks.











TITA WOODARD MD Aug 25, 2018 12:22

## 2018-08-25 NOTE — EKG
[facility]

              [address] [place] [address]

Test Date:    2018               Test Time:    03:43:01

Pat Name:     LUCRECIA BUSBY               Department:   

Patient ID:   [id number]           Room:         112 1

Gender:       M                        Technician:   

:          1975               Requested By: CHICA PATEL

Order Number: [id number]           Reading MD:   Mick Boyd MD

                                 Measurements

Intervals                              Axis          

Rate:         140                      P:            -125

RI:           96                       QRS:          90

QRSD:         108                      T:            45

QT:           284                                    

QTc:          436                                    

                           Interpretive Statements

probable sinus tachycardia

non-specific st/t changes. 

Electronically Signed On 2018 12:18:32 CDT by Mick Boyd MD

## 2018-08-25 NOTE — RAD
INDICATION: AMS

 

COMPARISON: None.

 

TECHNIQUE:

 

Axial CT images obtained through the head without contrast.

 

One or more of the following individualized dose reduction techniques were

utilized for this examination:  1. Automated exposure control;  2. 

Adjustment of the mA and/or kV according to patient size;  3. Use of 

iterative reconstruction technique.

 

FINDINGS:

 

The patient was scanned multiple times in order to attempt to obtain 

diagnostic images since the patient was moving throughout the examination.

There is persistent motion which somewhat limits the exam.

 

No midline shift.

Suprasellar cistern is not effaced.

There is some bowing of the nasal septum.

There is not a definite large hemorrhage seen in the visualized portions 

the brain but some areas are obscured.

 

IMPRESSION:

 

1.  There is persistent patient motion which obscures portions of the 

brain despite repeating the imaging multiple times.

 

2.  There is no evidence of midline shift or a definite large hemorrhage 

in the visualized portions of the brain but some areas are not well 

evaluated secondary to patient motion and if the patient can better 

tolerate at a later time the exam could be repeated.

 

Electronically signed by: Kade Chan MD (8/25/2018 5:47 AM) [id number]

## 2018-08-26 VITALS — DIASTOLIC BLOOD PRESSURE: 63 MMHG | SYSTOLIC BLOOD PRESSURE: 104 MMHG

## 2018-08-26 VITALS — DIASTOLIC BLOOD PRESSURE: 56 MMHG | SYSTOLIC BLOOD PRESSURE: 97 MMHG

## 2018-08-26 VITALS — SYSTOLIC BLOOD PRESSURE: 110 MMHG | DIASTOLIC BLOOD PRESSURE: 70 MMHG

## 2018-08-26 VITALS — DIASTOLIC BLOOD PRESSURE: 58 MMHG | SYSTOLIC BLOOD PRESSURE: 105 MMHG

## 2018-08-26 VITALS — DIASTOLIC BLOOD PRESSURE: 52 MMHG | SYSTOLIC BLOOD PRESSURE: 99 MMHG

## 2018-08-26 VITALS — DIASTOLIC BLOOD PRESSURE: 67 MMHG | SYSTOLIC BLOOD PRESSURE: 99 MMHG

## 2018-08-26 LAB
ANION GAP SERPL CALC-SCNC: 9 MMOL/L (ref 6–14)
BASOPHILS # BLD AUTO: 0 X10^3/UL (ref 0–0.2)
BASOPHILS NFR BLD: 0 % (ref 0–3)
BUN SERPL-MCNC: 18 MG/DL (ref 8–26)
CALCIUM SERPL-MCNC: 8.1 MG/DL (ref 8.5–10.1)
CHLORIDE SERPL-SCNC: 106 MMOL/L (ref 98–107)
CO2 SERPL-SCNC: 17 MMOL/L (ref 21–32)
CREAT SERPL-MCNC: 0.9 MG/DL (ref 0.7–1.3)
EOSINOPHIL NFR BLD: 0.2 X10^3/UL (ref 0–0.7)
EOSINOPHIL NFR BLD: 2 % (ref 0–3)
ERYTHROCYTE [DISTWIDTH] IN BLOOD BY AUTOMATED COUNT: 16.4 % (ref 11.5–14.5)
GFR SERPLBLD BASED ON 1.73 SQ M-ARVRAT: 92.1 ML/MIN
GLUCOSE SERPL-MCNC: 136 MG/DL (ref 70–99)
HCT VFR BLD CALC: 33.7 % (ref 39–53)
HGB BLD-MCNC: 11.2 G/DL (ref 13–17.5)
LYMPHOCYTES # BLD: 1.6 X10^3/UL (ref 1–4.8)
LYMPHOCYTES NFR BLD AUTO: 16 % (ref 24–48)
MCH RBC QN AUTO: 29 PG (ref 25–35)
MCHC RBC AUTO-ENTMCNC: 33 G/DL (ref 31–37)
MCV RBC AUTO: 87 FL (ref 79–100)
MONO #: 0.6 X10^3/UL (ref 0–1.1)
MONOCYTES NFR BLD: 6 % (ref 0–9)
NEUT #: 7.6 X10^3UL (ref 1.8–7.7)
NEUTROPHILS NFR BLD AUTO: 76 % (ref 31–73)
PLATELET # BLD AUTO: 149 X10^3/UL (ref 140–400)
POTASSIUM SERPL-SCNC: 3.4 MMOL/L (ref 3.5–5.1)
RBC # BLD AUTO: 3.89 X10^6/UL (ref 4.3–5.7)
SODIUM SERPL-SCNC: 132 MMOL/L (ref 136–145)
WBC # BLD AUTO: 9.9 X10^3/UL (ref 4–11)

## 2018-08-26 RX ADMIN — BACITRACIN SCH MLS/HR: 5000 INJECTION, POWDER, FOR SOLUTION INTRAMUSCULAR at 01:18

## 2018-08-26 RX ADMIN — VANCOMYCIN HYDROCHLORIDE PRN EACH: 1 INJECTION, POWDER, LYOPHILIZED, FOR SOLUTION INTRAVENOUS at 14:18

## 2018-08-26 RX ADMIN — BACITRACIN SCH MLS/HR: 5000 INJECTION, POWDER, FOR SOLUTION INTRAMUSCULAR at 13:58

## 2018-08-26 RX ADMIN — BACITRACIN SCH MLS/HR: 5000 INJECTION, POWDER, FOR SOLUTION INTRAMUSCULAR at 21:51

## 2018-08-26 RX ADMIN — MORPHINE SULFATE PRN MG: 2 INJECTION, SOLUTION INTRAMUSCULAR; INTRAVENOUS at 08:24

## 2018-08-26 RX ADMIN — MORPHINE SULFATE PRN MG: 2 INJECTION, SOLUTION INTRAMUSCULAR; INTRAVENOUS at 00:18

## 2018-08-26 RX ADMIN — NICOTINE PRN PATCH: 21 PATCH, EXTENDED RELEASE TOPICAL at 16:26

## 2018-08-26 RX ADMIN — HEPARIN SODIUM SCH UNIT: 5000 INJECTION, SOLUTION INTRAVENOUS; SUBCUTANEOUS at 06:19

## 2018-08-26 RX ADMIN — INSULIN LISPRO SCH UNITS: 100 INJECTION, SOLUTION INTRAVENOUS; SUBCUTANEOUS at 12:00

## 2018-08-26 RX ADMIN — Medication SCH CAP: at 21:50

## 2018-08-26 RX ADMIN — MORPHINE SULFATE PRN MG: 2 INJECTION, SOLUTION INTRAMUSCULAR; INTRAVENOUS at 21:50

## 2018-08-26 RX ADMIN — INSULIN LISPRO SCH UNITS: 100 INJECTION, SOLUTION INTRAVENOUS; SUBCUTANEOUS at 17:00

## 2018-08-26 RX ADMIN — CEFTRIAXONE SCH GM: 1 INJECTION, POWDER, FOR SOLUTION INTRAMUSCULAR; INTRAVENOUS at 11:56

## 2018-08-26 RX ADMIN — INSULIN GLARGINE SCH UNITS: 100 INJECTION, SOLUTION SUBCUTANEOUS at 21:52

## 2018-08-26 RX ADMIN — INSULIN LISPRO SCH UNITS: 100 INJECTION, SOLUTION INTRAVENOUS; SUBCUTANEOUS at 08:18

## 2018-08-26 RX ADMIN — MORPHINE SULFATE PRN MG: 2 INJECTION, SOLUTION INTRAMUSCULAR; INTRAVENOUS at 12:05

## 2018-08-26 RX ADMIN — HEPARIN SODIUM SCH UNIT: 5000 INJECTION, SOLUTION INTRAVENOUS; SUBCUTANEOUS at 14:20

## 2018-08-26 RX ADMIN — HEPARIN SODIUM SCH UNIT: 5000 INJECTION, SOLUTION INTRAVENOUS; SUBCUTANEOUS at 21:53

## 2018-08-26 RX ADMIN — MORPHINE SULFATE PRN MG: 2 INJECTION, SOLUTION INTRAMUSCULAR; INTRAVENOUS at 16:25

## 2018-08-26 NOTE — PDOC
PROGRESS NOTES


Chief Complaint


Chief Complaint


AMS, metabolic and toxic encephalopathy


drug abuse with meth


hyperthermia , resolved


LEV vasomotor


DM2 on insulin


rhabdomyolysis


elevated CE with lev, demanding ischemia


tobaccoism


leukocytosis, SIRS


LACTATE acidosis


hypomagnesemia


down syndrome


schizophrenia


Hyponatremia


Hypokalemia mild





History of Present Illness


History of Present Illness


Transferred out of ICU 8/25/18 after an overnight stay


Mentation now back to normal


Patient admits to methamphetamine use


Blisters on bilateral soles of the feet, was wearing sandals. Pictures on chart

, I have reviewed


Blood pressure, the rest of the labs okay


But called by RN today for GPC bacteremia 1 out of 2 bottles. CK MB is 

moderately elevated, creatinine kinase also elevated in the thousands, element 

of rhabdo


Potassium 3.4 mildly low with normal creatinine at 0.9.


Sodium mildly low 132


WBC 11, afebrile


Hemoglobin 9


Did test positive for amphetamines and cannabinoids on urine drug screen





Plan: We will continue IVF, recheck CK tomorrow


Did consult ID for the GPC bacteremia 1 of 2 bottles


I did start Rocephin


I  did not suspect was not suspicious for MRSA so I just did rocephin


Discussed with RN at bedside


Okay to resume oral hypoglycemics at home, still waiting for home meds





Vitals


Vitals





Vital Signs








  Date Time  Temp Pulse Resp B/P (MAP) Pulse Ox O2 Delivery O2 Flow Rate FiO2


 


8/26/18 12:37     96 Room Air 2.0 


 


8/26/18 11:59 98.2 77 19 105/58 (74)    





 98.2       











Physical Exam


General:  Alert, Oriented X3, Cooperative


Heart:  Regular rate, Normal S1, Normal S2


Lungs:  Clear


Abdomen:  Normal bowel sounds, Soft


Skin:  Other (blisters and  redness on bilateral soles of the feet, dressing in 

place)





Labs


LABS





Laboratory Tests








Test


 8/25/18


17:01 8/25/18


20:47 8/26/18


04:35 8/26/18


07:08


 


Glucose (Fingerstick)


 232 mg/dL


(70-99) 171 mg/dL


(70-99) 


 153 mg/dL


(70-99)


 


White Blood Count


 


 


 9.9 x10^3/uL


(4.0-11.0) 





 


Red Blood Count


 


 


 3.89 x10^6/uL


(4.30-5.70) 





 


Hemoglobin


 


 


 11.2 g/dL


(13.0-17.5) 





 


Hematocrit


 


 


 33.7 %


(39.0-53.0) 





 


Mean Corpuscular Volume   87 fL ()  


 


Mean Corpuscular Hemoglobin   29 pg (25-35)  


 


Mean Corpuscular Hemoglobin


Concent 


 


 33 g/dL


(31-37) 





 


Red Cell Distribution Width


 


 


 16.4 %


(11.5-14.5) 





 


Platelet Count


 


 


 149 x10^3/uL


(140-400) 





 


Neutrophils (%) (Auto)   76 % (31-73)  


 


Lymphocytes (%) (Auto)   16 % (24-48)  


 


Monocytes (%) (Auto)   6 % (0-9)  


 


Eosinophils (%) (Auto)   2 % (0-3)  


 


Basophils (%) (Auto)   0 % (0-3)  


 


Neutrophils # (Auto)


 


 


 7.6 x10^3uL


(1.8-7.7) 





 


Lymphocytes # (Auto)


 


 


 1.6 x10^3/uL


(1.0-4.8) 





 


Monocytes # (Auto)


 


 


 0.6 x10^3/uL


(0.0-1.1) 





 


Eosinophils # (Auto)


 


 


 0.2 x10^3/uL


(0.0-0.7) 





 


Basophils # (Auto)


 


 


 0.0 x10^3/uL


(0.0-0.2) 





 


Sodium Level


 


 


 132 mmol/L


(136-145) 





 


Potassium Level


 


 


 3.4 mmol/L


(3.5-5.1) 





 


Chloride Level


 


 


 106 mmol/L


() 





 


Carbon Dioxide Level


 


 


 17 mmol/L


(21-32) 





 


Anion Gap   9 (6-14)  


 


Blood Urea Nitrogen


 


 


 18 mg/dL


(8-26) 





 


Creatinine


 


 


 0.9 mg/dL


(0.7-1.3) 





 


Estimated GFR


(Cockcroft-Gault) 


 


 92.1 


 





 


Glucose Level


 


 


 136 mg/dL


(70-99) 





 


Calcium Level


 


 


 8.1 mg/dL


(8.5-10.1) 





 


Creatine Kinase


 


 


 01159 U/L


() 





 


Creatine Kinase MB (Mass)


 


 


 106.5 ng/mL


(0.0-3.6) 





 


Creatine Kinase MB Relative


Index 


 


 0.8 % (0-4) 


 





 


Test


 8/26/18


11:44 


 


 





 


Glucose (Fingerstick)


 156 mg/dL


(70-99) 


 


 














Assessment and Plan


Assessmemt and Plan


Problems


Medical Problems:


(1) Acute renal failure


Status: Acute  





(2) Fever 106 degrees F or over


Status: Acute  





(3) Heat exhaustion


Status: Acute  





(4) Methamphetamine abuse


Status: Acute  











Comment


Review of Relevant


I have reviewed the following items karlie (where applicable) has been applied.


Labs





Laboratory Tests








Test


 8/25/18


03:45 8/25/18


03:55 8/25/18


04:07 8/25/18


05:33


 


Urine Collection Type Unknown    


 


Urine Color Yellow    


 


Urine Clarity Clear    


 


Urine pH 5.0    


 


Urine Specific Gravity 1.025    


 


Urine Protein


 >=300 mg/dL


(NEG-TRACE) 


 


 





 


Urine Glucose (UA)


 >=1000 mg/dL


(NEG) 


 


 





 


Urine Ketones (Stick)


 Negative mg/dL


(NEG) 


 


 





 


Urine Blood Trace (NEG)    


 


Urine Nitrite Negative (NEG)    


 


Urine Bilirubin Small (NEG)    


 


Urine Urobilinogen Dipstick


 0.2 mg/dL (0.2


mg/dL) 


 


 





 


Urine Leukocyte Esterase Negative (NEG)    


 


Urine RBC


 11-20 /HPF


(0-2) 


 


 





 


Urine WBC 1-4 /HPF (0-4)    


 


Urine Squamous Epithelial


Cells Few /LPF 


 


 


 





 


Urine Bacteria


 Few /HPF


(0-FEW) 


 


 





 


Urine Hyaline Casts Moderate /HPF    


 


Urine Granular Casts Moderate /HPF    


 


Urine Mucus Slight /LPF    


 


Urine Opiates Screen Neg (NEG)    


 


Urine Methadone Screen Neg (NEG)    


 


Urine Barbiturates Neg (NEG)    


 


Urine Phencyclidine Screen Neg (NEG)    


 


Urine


Amphetamine/Methamphetamine Pos (NEG) 


 


 


 





 


Urine Benzodiazepines Screen Neg (NEG)    


 


Urine Cocaine Screen Neg (NEG)    


 


Urine Cannabinoids Screen Pos (NEG)    


 


Urine Ethyl Alcohol Neg (NEG)    


 


Glucose (Fingerstick)


 


 367 mg/dL


(70-99) 


 





 


White Blood Count


 


 


 16.5 x10^3/uL


(4.0-11.0) 





 


Red Blood Count


 


 


 4.63 x10^6/uL


(4.30-5.70) 





 


Hemoglobin


 


 


 13.3 g/dL


(13.0-17.5) 





 


Hematocrit


 


 


 38.6 %


(39.0-53.0) 





 


Mean Corpuscular Volume   83 fL ()  


 


Mean Corpuscular Hemoglobin   29 pg (25-35)  


 


Mean Corpuscular Hemoglobin


Concent 


 


 34 g/dL


(31-37) 





 


Red Cell Distribution Width


 


 


 15.4 %


(11.5-14.5) 





 


Platelet Count


 


 


 250 x10^3/uL


(140-400) 





 


Neutrophils (%) (Auto)   89 % (31-73)  


 


Lymphocytes (%) (Auto)   5 % (24-48)  


 


Monocytes (%) (Auto)   6 % (0-9)  


 


Eosinophils (%) (Auto)   0 % (0-3)  


 


Basophils (%) (Auto)   0 % (0-3)  


 


Neutrophils # (Auto)


 


 


 14.6 x10^3uL


(1.8-7.7) 





 


Lymphocytes # (Auto)


 


 


 0.9 x10^3/uL


(1.0-4.8) 





 


Monocytes # (Auto)


 


 


 0.9 x10^3/uL


(0.0-1.1) 





 


Eosinophils # (Auto)


 


 


 0.0 x10^3/uL


(0.0-0.7) 





 


Basophils # (Auto)


 


 


 0.0 x10^3/uL


(0.0-0.2) 





 


Segmented Neutrophils %   84 % (35-66)  


 


Band Neutrophils %   8 % (0-9)  


 


Lymphocytes %   4 % (24-48)  


 


Monocytes %   4 % (0-10)  


 


Platelet Estimate


 


 


 Adequate


(ADEQUATE) 





 


Large Platelets   Few  


 


Polychromasia   Slight  


 


Prothrombin Time


 


 


 14.5 SEC


(11.7-14.0) 





 


Prothromb Time International


Ratio 


 


 1.2 (0.8-1.1) 


 





 


Sodium Level


 


 


 137 mmol/L


(136-145) 





 


Potassium Level


 


 


 4.5 mmol/L


(3.5-5.1) 





 


Chloride Level


 


 


 105 mmol/L


() 





 


Carbon Dioxide Level


 


 


 16 mmol/L


(21-32) 





 


Anion Gap   16 (6-14)  


 


Blood Urea Nitrogen


 


 


 48 mg/dL


(8-26) 





 


Creatinine


 


 


 2.9 mg/dL


(0.7-1.3) 





 


Estimated GFR


(Cockcroft-Gault) 


 


 23.9 


 





 


BUN/Creatinine Ratio   17 (6-20)  


 


Glucose Level


 


 


 380 mg/dL


(70-99) 





 


Hemoglobin A1c


 


 


 9.6 %


(4.8-5.6) 





 


Lactic Acid Level


 


 


 4.4 mmol/L


(0.4-2.0) 





 


Calcium Level


 


 


 9.3 mg/dL


(8.5-10.1) 





 


Magnesium Level


 


 


 1.7 mg/dL


(1.8-2.4) 





 


Total Bilirubin


 


 


 0.3 mg/dL


(0.2-1.0) 





 


Aspartate Amino Transf


(AST/SGOT) 


 


 42 U/L (15-37) 


 





 


Alanine Aminotransferase


(ALT/SGPT) 


 


 7 U/L (16-63) 


 





 


Alkaline Phosphatase


 


 


 172 U/L


() 





 


Creatine Kinase


 


 


 1170 U/L


() 





 


Creatine Kinase MB (Mass)


 


 


 5.3 ng/mL


(0.0-3.6) 





 


Creatine Kinase MB Relative


Index 


 


 0.5 % (0-4) 


 





 


Troponin I Quantitative


 


 


 0.400 ng/mL


(0.000-0.055) 





 


Total Protein


 


 


 7.5 g/dL


(6.4-8.2) 





 


Albumin


 


 


 4.1 g/dL


(3.4-5.0) 





 


Albumin/Globulin Ratio   1.2 (1.0-1.7)  


 


Salicylates Level


 


 


 6.6 mg/dL


(2.8-20.0) 





 


Salicylate Last Dose Date   unknown  


 


Salicylate Last Dose Time   unknown  


 


Acetaminophen Level


 


 


 < 2.0 mcg/ml


(10-30) 





 


Acetaminophen Last Dose Date   unknown  


 


Acetaminophen Last Dose Time   unknown  


 


Ethyl Alcohol Level


 


 


 < 10 mg/dL


(0-10) 





 


Acetone Level   Neg (NEG)  


 


O2 Saturation    89 % (92-99) 


 


Arterial Blood pH


 


 


 


 7.42


(7.35-7.45)


 


Arterial Blood pCO2 at


Patient Temp 


 


 


 21 mmHg


(35-46)


 


Arterial Blood pO2 at Patient


Temp 


 


 


 59 mmHg


()


 


Arterial Blood HCO3


 


 


 


 13 mmol/L


(21-28)


 


Arterial Blood Base Excess


 


 


 


 -9 mmol/L


(-3-3)


 


FiO2    21.0 


 


Test


 8/25/18


06:10 8/25/18


10:55 8/25/18


11:00 8/25/18


12:02


 


Nasal Screen MRSA (PCR)


 Negative


(Negative) 


 


 





 


White Blood Count


 


 14.0 x10^3/uL


(4.0-11.0) 


 





 


Red Blood Count


 


 4.01 x10^6/uL


(4.30-5.70) 


 





 


Hemoglobin


 


 11.4 g/dL


(13.0-17.5) 


 





 


Hematocrit


 


 33.8 %


(39.0-53.0) 


 





 


Mean Corpuscular Volume  84 fL ()   


 


Mean Corpuscular Hemoglobin  28 pg (25-35)   


 


Mean Corpuscular Hemoglobin


Concent 


 34 g/dL


(31-37) 


 





 


Red Cell Distribution Width


 


 15.9 %


(11.5-14.5) 


 





 


Platelet Count


 


 190 x10^3/uL


(140-400) 


 





 


Neutrophils (%) (Auto)  85 % (31-73)   


 


Lymphocytes (%) (Auto)  7 % (24-48)   


 


Monocytes (%) (Auto)  8 % (0-9)   


 


Eosinophils (%) (Auto)  0 % (0-3)   


 


Basophils (%) (Auto)  0 % (0-3)   


 


Neutrophils # (Auto)


 


 12.0 x10^3uL


(1.8-7.7) 


 





 


Lymphocytes # (Auto)


 


 1.0 x10^3/uL


(1.0-4.8) 


 





 


Monocytes # (Auto)


 


 1.1 x10^3/uL


(0.0-1.1) 


 





 


Eosinophils # (Auto)


 


 0.0 x10^3/uL


(0.0-0.7) 


 





 


Basophils # (Auto)


 


 0.0 x10^3/uL


(0.0-0.2) 


 





 


Sodium Level


 


 134 mmol/L


(136-145) 


 





 


Potassium Level


 


 3.4 mmol/L


(3.5-5.1) 


 





 


Chloride Level


 


 103 mmol/L


() 


 





 


Carbon Dioxide Level


 


 15 mmol/L


(21-32) 


 





 


Anion Gap  16 (6-14)   


 


Blood Urea Nitrogen


 


 41 mg/dL


(8-26) 


 





 


Creatinine


 


 1.7 mg/dL


(0.7-1.3) 


 





 


Estimated GFR


(Cockcroft-Gault) 


 44.2 


 


 





 


Glucose Level


 


 342 mg/dL


(70-99) 


 





 


Lactic Acid Level


 


 2.2 mmol/L


(0.4-2.0) 


 





 


Calcium Level


 


 8.3 mg/dL


(8.5-10.1) 


 





 


Creatine Kinase


 


 


 6994 U/L


() 





 


Creatine Kinase MB (Mass)


 


 


 58.9 ng/mL


(0.0-3.6) 





 


Creatine Kinase MB Relative


Index 


 


 0.8 % (0-4) 


 





 


Troponin I Quantitative


 


 


 0.589 ng/mL


(0.000-0.055) 





 


Glucose (Fingerstick)


 


 


 


 281 mg/dL


(70-99)


 


Test


 8/25/18


17:01 8/25/18


20:47 8/26/18


04:35 8/26/18


07:08


 


Glucose (Fingerstick)


 232 mg/dL


(70-99) 171 mg/dL


(70-99) 


 153 mg/dL


(70-99)


 


White Blood Count


 


 


 9.9 x10^3/uL


(4.0-11.0) 





 


Red Blood Count


 


 


 3.89 x10^6/uL


(4.30-5.70) 





 


Hemoglobin


 


 


 11.2 g/dL


(13.0-17.5) 





 


Hematocrit


 


 


 33.7 %


(39.0-53.0) 





 


Mean Corpuscular Volume   87 fL ()  


 


Mean Corpuscular Hemoglobin   29 pg (25-35)  


 


Mean Corpuscular Hemoglobin


Concent 


 


 33 g/dL


(31-37) 





 


Red Cell Distribution Width


 


 


 16.4 %


(11.5-14.5) 





 


Platelet Count


 


 


 149 x10^3/uL


(140-400) 





 


Neutrophils (%) (Auto)   76 % (31-73)  


 


Lymphocytes (%) (Auto)   16 % (24-48)  


 


Monocytes (%) (Auto)   6 % (0-9)  


 


Eosinophils (%) (Auto)   2 % (0-3)  


 


Basophils (%) (Auto)   0 % (0-3)  


 


Neutrophils # (Auto)


 


 


 7.6 x10^3uL


(1.8-7.7) 





 


Lymphocytes # (Auto)


 


 


 1.6 x10^3/uL


(1.0-4.8) 





 


Monocytes # (Auto)


 


 


 0.6 x10^3/uL


(0.0-1.1) 





 


Eosinophils # (Auto)


 


 


 0.2 x10^3/uL


(0.0-0.7) 





 


Basophils # (Auto)


 


 


 0.0 x10^3/uL


(0.0-0.2) 





 


Sodium Level


 


 


 132 mmol/L


(136-145) 





 


Potassium Level


 


 


 3.4 mmol/L


(3.5-5.1) 





 


Chloride Level


 


 


 106 mmol/L


() 





 


Carbon Dioxide Level


 


 


 17 mmol/L


(21-32) 





 


Anion Gap   9 (6-14)  


 


Blood Urea Nitrogen


 


 


 18 mg/dL


(8-26) 





 


Creatinine


 


 


 0.9 mg/dL


(0.7-1.3) 





 


Estimated GFR


(Cockcroft-Gault) 


 


 92.1 


 





 


Glucose Level


 


 


 136 mg/dL


(70-99) 





 


Calcium Level


 


 


 8.1 mg/dL


(8.5-10.1) 





 


Creatine Kinase


 


 


 79097 U/L


() 





 


Creatine Kinase MB (Mass)


 


 


 106.5 ng/mL


(0.0-3.6) 





 


Creatine Kinase MB Relative


Index 


 


 0.8 % (0-4) 


 





 


Test


 8/26/18


11:44 


 


 





 


Glucose (Fingerstick)


 156 mg/dL


(70-99) 


 


 











Laboratory Tests








Test


 8/25/18


17:01 8/25/18


20:47 8/26/18


04:35 8/26/18


07:08


 


Glucose (Fingerstick)


 232 mg/dL


(70-99) 171 mg/dL


(70-99) 


 153 mg/dL


(70-99)


 


White Blood Count


 


 


 9.9 x10^3/uL


(4.0-11.0) 





 


Red Blood Count


 


 


 3.89 x10^6/uL


(4.30-5.70) 





 


Hemoglobin


 


 


 11.2 g/dL


(13.0-17.5) 





 


Hematocrit


 


 


 33.7 %


(39.0-53.0) 





 


Mean Corpuscular Volume   87 fL ()  


 


Mean Corpuscular Hemoglobin   29 pg (25-35)  


 


Mean Corpuscular Hemoglobin


Concent 


 


 33 g/dL


(31-37) 





 


Red Cell Distribution Width


 


 


 16.4 %


(11.5-14.5) 





 


Platelet Count


 


 


 149 x10^3/uL


(140-400) 





 


Neutrophils (%) (Auto)   76 % (31-73)  


 


Lymphocytes (%) (Auto)   16 % (24-48)  


 


Monocytes (%) (Auto)   6 % (0-9)  


 


Eosinophils (%) (Auto)   2 % (0-3)  


 


Basophils (%) (Auto)   0 % (0-3)  


 


Neutrophils # (Auto)


 


 


 7.6 x10^3uL


(1.8-7.7) 





 


Lymphocytes # (Auto)


 


 


 1.6 x10^3/uL


(1.0-4.8) 





 


Monocytes # (Auto)


 


 


 0.6 x10^3/uL


(0.0-1.1) 





 


Eosinophils # (Auto)


 


 


 0.2 x10^3/uL


(0.0-0.7) 





 


Basophils # (Auto)


 


 


 0.0 x10^3/uL


(0.0-0.2) 





 


Sodium Level


 


 


 132 mmol/L


(136-145) 





 


Potassium Level


 


 


 3.4 mmol/L


(3.5-5.1) 





 


Chloride Level


 


 


 106 mmol/L


() 





 


Carbon Dioxide Level


 


 


 17 mmol/L


(21-32) 





 


Anion Gap   9 (6-14)  


 


Blood Urea Nitrogen


 


 


 18 mg/dL


(8-26) 





 


Creatinine


 


 


 0.9 mg/dL


(0.7-1.3) 





 


Estimated GFR


(Cockcroft-Gault) 


 


 92.1 


 





 


Glucose Level


 


 


 136 mg/dL


(70-99) 





 


Calcium Level


 


 


 8.1 mg/dL


(8.5-10.1) 





 


Creatine Kinase


 


 


 01569 U/L


() 





 


Creatine Kinase MB (Mass)


 


 


 106.5 ng/mL


(0.0-3.6) 





 


Creatine Kinase MB Relative


Index 


 


 0.8 % (0-4) 


 





 


Test


 8/26/18


11:44 


 


 





 


Glucose (Fingerstick)


 156 mg/dL


(70-99) 


 


 











Microbiology


8/25/18 Blood Culture - Final, Complete


Medications





Current Medications


Sodium Chloride 1,000 ml @  1,000 mls/hr Q1H IV  Last administered on 8/25/18at 

04:06;  Start 8/25/18 at 04:00;  Stop 8/25/18 at 04:59;  Status DC


Lorazepam (Ativan) 2 mg 1X  ONCE IV  Last administered on 8/25/18at 04:06;  

Start 8/25/18 at 04:00;  Stop 8/25/18 at 04:05;  Status DC


Lorazepam (Ativan) 2 mg 1X  ONCE IV  Last administered on 8/25/18at 04:23;  

Start 8/25/18 at 04:30;  Stop 8/25/18 at 04:32;  Status DC


Acetaminophen (Tylenol) 1,000 mg 1X  ONCE PO  Last administered on 8/25/18at 05:

00;  Start 8/25/18 at 05:00;  Stop 8/25/18 at 05:01;  Status DC


Lorazepam (Ativan) 2 mg 1X  ONCE IV ;  Start 8/25/18 at 05:00;  Stop 8/25/18 at 

05:01;  Status DC


Ondansetron HCl (Zofran) 4 mg PRN Q8HRS  PRN IV NAUSEA/VOMITING;  Start 8/25/18 

at 05:30;  Stop 8/26/18 at 05:29;  Status DC


Sodium Chloride 1,000 ml @  250 mls/hr Q4H IV  Last administered on 8/25/18at 20

:56;  Start 8/25/18 at 05:18;  Stop 8/26/18 at 05:17;  Status DC


Sodium Chloride 1,000 ml @  1,000 mls/hr 1X  ONCE IV  Last administered on 8/25/ 18at 08:15;  Start 8/25/18 at 08:15;  Stop 8/25/18 at 09:14;  Status DC


Sodium Chloride 1,000 ml @  1,000 mls/hr 1X  ONCE IV  Last administered on 8/25/ 18at 08:15;  Start 8/25/18 at 08:15;  Stop 8/25/18 at 09:14;  Status DC


Insulin Human Lispro (HumaLOG) 0-9 UNITS TIDWMEALS SQ  Last administered on 8/26 /18at 08:18;  Start 8/25/18 at 12:00


Dextrose (Dextrose 50%-Water Syringe) 12.5 gm PRN Q15MIN  PRN IV SEE COMMENTS;  

Start 8/25/18 at 08:15


Nicotine (Nicoderm Cq 21mg) 1 patch PRN DAILY  PRN TD SMOKING CESSATION Last 

administered on 8/25/18at 12:43;  Start 8/25/18 at 11:30


Potassium Chloride (Klor-Con) 40 meq 1X  ONCE PO  Last administered on 8/25/ 18at 12:43;  Start 8/25/18 at 12:00;  Stop 8/25/18 at 12:01;  Status DC


Acetaminophen (Tylenol) 650 mg PRN Q6HRS  PRN PO FEVER;  Start 8/25/18 at 13:15


Ondansetron HCl (Zofran) 4 mg PRN Q6HRS  PRN IV NAUSEA/VOMITING;  Start 8/25/18 

at 13:15


Morphine Sulfate (Morphine Sulfate) 2 mg PRN Q2HR  PRN IV MODERATE TO SEVERE 

PAIN Last administered on 8/26/18at 12:05;  Start 8/25/18 at 13:15


Tramadol HCl (Ultram) 50 mg PRN Q6HRS  PRN PO MILD TO MODERATE PAIN Last 

administered on 8/26/18at 12:06;  Start 8/25/18 at 13:15


Docusate Sodium (Colace) 100 mg PRN DAILY  PRN PO CONSTIPATION;  Start 8/25/18 

at 13:15


Heparin Sodium (Porcine) (Heparin Sq) 5,000 unit Q8HRS SQ  Last administered on 

8/26/18at 06:19;  Start 8/25/18 at 14:00


Insulin Glargine (Lantus) 20 units QHS SQ  Last administered on 8/25/18at 21:08

;  Start 8/25/18 at 21:00


Vancomycin HCl (Vanco Per Pharmacy) 1 each PRN DAILY  PRN MC SEE COMMENTS;  

Start 8/26/18 at 09:00;  Stop 8/26/18 at 09:01;  Status DC


Ceftriaxone Sodium 1 gm/ Dextrose 50 ml @  100 mls/hr Q24H IV ;  Start 8/26/18 

at 09:00;  Stop 8/26/18 at 09:08;  Status DC


Ceftriaxone Sodium (Rocephin) 1 gm Q24H IVP  Last administered on 8/26/18at 11:

56;  Start 8/26/18 at 10:00


Vancomycin HCl (Vanco Per Pharmacy) 1 each PRN DAILY  PRN [facility] SEE COMMENTS;  

Start 8/26/18 at 12:15


Vancomycin HCl 2 gm/Sodium Chloride 500 ml @  250 mls/hr 1X  ONCE IV ;  Start 8/ 26/18 at 13:00;  Stop 8/26/18 at 14:59


Vitals/I & O





Vital Sign - Last 24 Hours








 8/25/18 8/25/18 8/25/18 8/25/18





 15:00 19:03 20:00 20:03


 


Temp 98.1   





 98.1   


 


Pulse 88   


 


Resp 18   


 


B/P (MAP) 120/76 (91)   


 


Pulse Ox 98 98  


 


O2 Delivery Room Air Room Air Room Air Room Air


 


O2 Flow Rate  2.0  


 


    





    





 8/25/18 8/25/18 8/26/18 8/26/18





 20:26 21:02 00:04 00:18


 


Temp 97.9  98.0 





 97.9  98.0 


 


Pulse 82  81 


 


Resp 20  18 


 


B/P (MAP) 103/69 (80)  104/63 (77) 


 


Pulse Ox 97  97 


 


O2 Delivery Room Air Room Air Room Air Room Air


 


    





    





 8/26/18 8/26/18 8/26/18 8/26/18





 04:00 08:00 08:10 08:24


 


Temp 98.1  97.8 





 98.1  97.8 


 


Pulse 76  67 


 


Resp 20  20 


 


B/P (MAP) 110/70 (83)  99/52 (68) 


 


Pulse Ox 95  96 


 


O2 Delivery Room Air Room Air Room Air Room Air


 


O2 Flow Rate  2.0  


 


    





    





 8/26/18 8/26/18 8/26/18 8/26/18





 11:59 12:05 12:06 12:37


 


Temp 98.2   





 98.2   


 


Pulse 77   


 


Resp 19   


 


B/P (MAP) 105/58 (74)   


 


Pulse Ox 96 96 96 96


 


O2 Delivery Room Air Room Air Room Air Room Air


 


O2 Flow Rate  2.0 2.0 2.0














Intake and Output   


 


 8/25/18 8/25/18 8/26/18





 15:00 23:00 07:00


 


Intake Total 4050 ml 900 ml 500 ml


 


Output Total 845 ml 1450 ml 2200 ml


 


Balance 3205 ml -550 ml -1700 ml

















PARTH OVERTON MD Aug 26, 2018 12:51

## 2018-08-27 VITALS — DIASTOLIC BLOOD PRESSURE: 65 MMHG | SYSTOLIC BLOOD PRESSURE: 111 MMHG

## 2018-08-27 VITALS — DIASTOLIC BLOOD PRESSURE: 68 MMHG | SYSTOLIC BLOOD PRESSURE: 99 MMHG

## 2018-08-27 VITALS — SYSTOLIC BLOOD PRESSURE: 102 MMHG | DIASTOLIC BLOOD PRESSURE: 68 MMHG

## 2018-08-27 VITALS — SYSTOLIC BLOOD PRESSURE: 107 MMHG | DIASTOLIC BLOOD PRESSURE: 67 MMHG

## 2018-08-27 VITALS — DIASTOLIC BLOOD PRESSURE: 68 MMHG | SYSTOLIC BLOOD PRESSURE: 109 MMHG

## 2018-08-27 VITALS — SYSTOLIC BLOOD PRESSURE: 103 MMHG | DIASTOLIC BLOOD PRESSURE: 61 MMHG

## 2018-08-27 LAB
BASOPHILS # BLD AUTO: 0 X10^3/UL (ref 0–0.2)
BASOPHILS NFR BLD: 1 % (ref 0–3)
EOSINOPHIL NFR BLD: 0.3 X10^3/UL (ref 0–0.7)
EOSINOPHIL NFR BLD: 3 % (ref 0–3)
ERYTHROCYTE [DISTWIDTH] IN BLOOD BY AUTOMATED COUNT: 15.5 % (ref 11.5–14.5)
HCT VFR BLD CALC: 35.6 % (ref 39–53)
HGB BLD-MCNC: 12.1 G/DL (ref 13–17.5)
LYMPHOCYTES # BLD: 1.5 X10^3/UL (ref 1–4.8)
LYMPHOCYTES NFR BLD AUTO: 19 % (ref 24–48)
MCH RBC QN AUTO: 29 PG (ref 25–35)
MCHC RBC AUTO-ENTMCNC: 34 G/DL (ref 31–37)
MCV RBC AUTO: 84 FL (ref 79–100)
MONO #: 0.6 X10^3/UL (ref 0–1.1)
MONOCYTES NFR BLD: 8 % (ref 0–9)
NEUT #: 5.6 X10^3UL (ref 1.8–7.7)
NEUTROPHILS NFR BLD AUTO: 70 % (ref 31–73)
PLATELET # BLD AUTO: 155 X10^3/UL (ref 140–400)
RBC # BLD AUTO: 4.25 X10^6/UL (ref 4.3–5.7)
WBC # BLD AUTO: 8 X10^3/UL (ref 4–11)

## 2018-08-27 RX ADMIN — BACITRACIN SCH MLS/HR: 5000 INJECTION, POWDER, FOR SOLUTION INTRAMUSCULAR at 05:00

## 2018-08-27 RX ADMIN — CEFTRIAXONE SCH GM: 1 INJECTION, POWDER, FOR SOLUTION INTRAMUSCULAR; INTRAVENOUS at 11:45

## 2018-08-27 RX ADMIN — VANCOMYCIN HYDROCHLORIDE SCH MLS/HR: 1 INJECTION, POWDER, FOR SOLUTION INTRAVENOUS at 02:06

## 2018-08-27 RX ADMIN — INSULIN LISPRO SCH UNITS: 100 INJECTION, SOLUTION INTRAVENOUS; SUBCUTANEOUS at 17:50

## 2018-08-27 RX ADMIN — INSULIN GLARGINE SCH UNITS: 100 INJECTION, SOLUTION SUBCUTANEOUS at 20:57

## 2018-08-27 RX ADMIN — INSULIN LISPRO SCH UNITS: 100 INJECTION, SOLUTION INTRAVENOUS; SUBCUTANEOUS at 08:00

## 2018-08-27 RX ADMIN — BACITRACIN SCH MLS/HR: 5000 INJECTION, POWDER, FOR SOLUTION INTRAMUSCULAR at 13:00

## 2018-08-27 RX ADMIN — Medication SCH CAP: at 20:53

## 2018-08-27 RX ADMIN — BACITRACIN SCH MLS/HR: 5000 INJECTION, POWDER, FOR SOLUTION INTRAMUSCULAR at 20:53

## 2018-08-27 RX ADMIN — HEPARIN SODIUM SCH UNIT: 5000 INJECTION, SOLUTION INTRAVENOUS; SUBCUTANEOUS at 13:35

## 2018-08-27 RX ADMIN — HEPARIN SODIUM SCH UNIT: 5000 INJECTION, SOLUTION INTRAVENOUS; SUBCUTANEOUS at 06:22

## 2018-08-27 RX ADMIN — VANCOMYCIN HYDROCHLORIDE SCH MLS/HR: 1 INJECTION, POWDER, FOR SOLUTION INTRAVENOUS at 13:35

## 2018-08-27 RX ADMIN — Medication SCH CAP: at 11:12

## 2018-08-27 RX ADMIN — VANCOMYCIN HYDROCHLORIDE PRN EACH: 1 INJECTION, POWDER, LYOPHILIZED, FOR SOLUTION INTRAVENOUS at 14:57

## 2018-08-27 RX ADMIN — NICOTINE PRN PATCH: 21 PATCH, EXTENDED RELEASE TOPICAL at 13:30

## 2018-08-27 RX ADMIN — HEPARIN SODIUM SCH UNIT: 5000 INJECTION, SOLUTION INTRAVENOUS; SUBCUTANEOUS at 20:58

## 2018-08-27 RX ADMIN — INSULIN LISPRO SCH UNITS: 100 INJECTION, SOLUTION INTRAVENOUS; SUBCUTANEOUS at 12:00

## 2018-08-27 NOTE — CONS
DATE OF CONSULTATION:  



REFERRING PHYSICIAN:  Dr. Gutierrez.



REASON FOR CONSULTATION:  Gram-positive bacteremia.



HISTORY OF PRESENT ILLNESS:  A 42-year-old male with polysubstance abuse,

presented to the ER with rhabdomyolysis, LEV, elevated troponin and fever of

105, blisters over both lower extremities.  White count was elevated at 16.5. 

Blood cultures were done, which are reported one out of two bottles positive for

GPC-ID and HIRAL is pending at this time.  The patient was started on empiric

Rocephin on admission and later on was started on vancomycin on 08/26/2018.  He

had a history of fall prior to admission and there was also altered mental

status.  Impression showed persistent patient motion, which obscures the portion

of pain despite repeating the image multiple times.  There was no evidence of

midline shift or definite large hemorrhage in the visualized portions of the

brain, but some areas were not well evaluated secondary to patient motion.  The

patient's mental status improved subsequently.  His troponin was high. 

Cardiology was consulted.  He underwent an echocardiogram, which shows left

ventricular systolic function is normal and ejection fraction is within normal

range.  Ejection fraction is about 55%.  There is normal LV segmental wall

motion.  Fever has resolved.  White count has come down to normal.  UA showed

negative leukocyte esterase with WBC.  Nasal screen for MRSA PCR was negative. 

The patient's CK was elevated at 13,000, currently is around 7000.  History is

limited due to history of Down syndrome, schizophrenia, history obtained from

staff.  The patient was able to give limited information.



PAST MEDICAL HISTORY:  Diabetes mellitus 2, on insulin; tobaccoism,

polysubstance abuse, Down syndrome, schizophrenia.



ALLERGIES:  No known drug allergies.



SOCIAL HISTORY:  Smokes 1 pack per day, ETOH rare.  Drugs crystal meth.  Lives

at home with brother and mother.



REVIEW OF SYSTEMS:  Limited, but as above in HPI.



PHYSICAL EXAMINATION:

VITAL SIGNS:  Temperature 97.5, pulse 70, respiratory rate 20, blood pressure

102/68, oxygen saturation 96% on room air.  T-max on admission was 106.1.

GENERAL:  Alert, awake male, in no acute distress, cooperative, appropriate

mood.

HEENT:  Normocephalic, atraumatic, anicteric.  No thrush.  Oropharynx clear and

moist.

NECK:  Supple.

LUNGS:  Clear bilaterally.

HEART:  S1, S2, no murmurs.

ABDOMEN:  Soft, nontender, nondistended.  Bowel sounds present.

EXTREMITIES:  No cyanosis, no clubbing.

PSYCHIATRIC:  Appropriate mood and affect.

SKIN:  Bilateral lower extremity dressing intact, removed, numerous blisters and

superficial abrasions present both soles and anterior leg shin and no evidence

of secondary bacterial infection.

NEUROLOGIC:  Alert and oriented.  Grossly nonfocal, appropriate speech.



IMAGING:  Head CT as above.



LABORATORY DATA:  WBC 16.5, repeat 8.0; hemoglobin 13.3, repeat 12.1; hematocrit

35.6, platelets 155, neutrophils 70.  Sodium 132, potassium 3.4, chloride 106,

bicarbonate 17, BUN 18, creatinine 0.9, glucose 136.  Alkaline phosphatase 172,

AST 42.  Lactate 4.4 on admission, repeat 2.2.  Troponin 0.589.  UDS positive

for methamphetamine, cannabinoids.  Acetone negative.  Nasal screen MRSA PCR

negative.  UA negative.



IMPRESSION:

1.  Fever, resolved.

2.  Altered mental status, metabolic and toxic encephalopathy improving.  CT

negative for acute changes, though limited.

3.  Drug abuse with methamphetamine.

4.  Blood culture 1 out of 2 bottles positive with GPC-ID and HIRAL pending at

this time.

5.  heat exhaustion, resolved.

6.  Numerous blisters both lower extremities, could be the source for GPC,

improving. No evidence of secondary bacterial infection at this time 

7.  Acute kidney injury with rhabdo.

8.  Borderline elevation of liver function tests from rhabdo.

9.  Diabetes mellitus 2.

10.  Tobaccoism.

11.  Lactic acidosis, resolved.

12.  Hypomagnesemia.

13.  Down syndrome.

14.  Schizophrenia.

15.  Hyponatremia, hypokalemia, hypomagnesemia, resolved.

16.  Elevated troponin and CK.  No clear cardiac etiology.  Echo is within

normal limits.



RECOMMENDATIONS:

1.  Continue empiric vancomycin and Rocephin.Monitor renal functions closely

2.  Continue local wound care for both lower extremities.

3.  Optimal diabetes control as you are doing.

4.  Follow up cultures and final ID of GPC 1/2 bottles from 08/25/2018, could be

a contaminant.

5.  Continue supportive care.

6.  Monitor renal functions closely.

7.  Adjust dose of vancomycin as needed.



Thank you, Dr. Gutierrez, for consulting Infectious Disease to participate in this

patient's care.  If you have any questions, do not hesitate to contact me.

 



______________________________

CALLI GASTELUM MD



DR:  Vikas  JOB#:  [id number] / [id number]

DD:  08/27/2018 11:03  DT:  08/27/2018 14:54

[place]

## 2018-08-27 NOTE — PDOC
Infectious Disease Note


Subjective:


Subjective


Pt seen


D/W RN





ROS:


ROS


Negative except for above.





Vital Signs:


Vital Signs





Vital Signs








  Date Time  Temp Pulse Resp B/P (MAP) Pulse Ox O2 Delivery O2 Flow Rate FiO2


 


8/27/18 08:00      Room Air 2.0 


 


8/27/18 07:54 97.5 70 20 102/68 (79) 96   





 97.5       











Medications:


Inpatient Meds:





Current Medications








 Medications


  (Trade)  Dose


 Ordered  Sig/Roxy  Start Time


 Stop Time Status Last Admin


Dose Admin


 


 Acetaminophen


  (Tylenol)  650 mg  PRN Q6HRS  PRN  8/25/18 13:15


     





 


 Ceftriaxone


 Sodium 1 gm/


 Dextrose  50 ml @ 


 100 mls/hr  Q24H  8/26/18 09:00


 8/26/18 09:08 DC  





 


 Ceftriaxone Sodium


  (Rocephin)  1 gm  Q24H  8/26/18 10:00


    8/26/18 11:56


1 GM


 


 Dextrose


  (Dextrose


 50%-Water Syringe)  12.5 gm  PRN Q15MIN  PRN  8/25/18 08:15


     





 


 Docusate Sodium


  (Colace)  100 mg  PRN DAILY  PRN  8/25/18 13:15


     





 


 Heparin Sodium


  (Porcine)


  (Heparin Sq)  5,000 unit  Q8HRS  8/25/18 14:00


    8/27/18 06:22


5,000 UNIT


 


 Insulin Glargine


  (Lantus)  20 units  QHS  8/25/18 21:00


    8/26/18 21:52


20 UNITS


 


 Insulin Human


 Lispro


  (HumaLOG)  0-9 UNITS  TIDWMEALS  8/25/18 12:00


    8/26/18 08:18


4 UNITS


 


 Lactobacillus


 Rhamnosus


  (Culturelle)  1 cap  BID  8/26/18 21:00


    8/26/18 21:50


1 CAP


 


 Lorazepam


  (Ativan)  2 mg  1X  ONCE  8/25/18 05:00


 8/25/18 05:01 DC  





 


 Morphine Sulfate


  (Morphine


 Sulfate)  2 mg  PRN Q2HR  PRN  8/25/18 13:15


    8/26/18 21:50


2 MG


 


 Nicotine


  (Nicoderm Cq


 21mg)  1 patch  PRN DAILY  PRN  8/25/18 11:30


    8/26/18 16:26


1 PATCH


 


 Ondansetron HCl


  (Zofran)  4 mg  PRN Q6HRS  PRN  8/25/18 13:15


     





 


 Potassium Chloride


  (Klor-Con)  40 meq  1X  ONCE  8/25/18 12:00


 8/25/18 12:01 DC 8/25/18 12:43


40 MEQ


 


 Sodium Chloride  1,000 ml @ 


 125 mls/hr  Q8H  8/26/18 13:00


    8/26/18 21:51


125 MLS/HR


 


 Tramadol HCl


  (Ultram)  50 mg  PRN Q6HRS  PRN  8/25/18 13:15


    8/27/18 06:24


50 MG


 


 Vancomycin HCl


  (Vanco Per


 Pharmacy)  1 each  PRN DAILY  PRN  8/26/18 12:15


    8/26/18 14:18


1 EACH


 


 Vancomycin HCl


  (Vancomycin


 Trough Level)  1 each  1X  ONCE  8/28/18 13:30


 8/28/18 13:31   





 


 Vancomycin HCl


 1.5 gm/Sodium


 Chloride  500 ml @ 


 250 mls/hr  Q12H  8/27/18 02:00


    8/27/18 02:06


250 MLS/HR


 


 Vancomycin HCl 2


 gm/Sodium Chloride  500 ml @ 


 250 mls/hr  1X  ONCE  8/26/18 13:00


 8/26/18 14:59 DC 8/26/18 13:58


250 MLS/HR











Labs:


Lab





Laboratory Tests








Test


 8/26/18


11:44 8/26/18


16:45 8/26/18


20:36 8/27/18


03:15


 


Glucose (Fingerstick)


 156 mg/dL


(70-99) 151 mg/dL


(70-99) 151 mg/dL


(70-99) 





 


White Blood Count


 


 


 


 8.0 x10^3/uL


(4.0-11.0)


 


Red Blood Count


 


 


 


 4.25 x10^6/uL


(4.30-5.70)


 


Hemoglobin


 


 


 


 12.1 g/dL


(13.0-17.5)


 


Hematocrit


 


 


 


 35.6 %


(39.0-53.0)


 


Mean Corpuscular Volume    84 fL () 


 


Mean Corpuscular Hemoglobin    29 pg (25-35) 


 


Mean Corpuscular Hemoglobin


Concent 


 


 


 34 g/dL


(31-37)


 


Red Cell Distribution Width


 


 


 


 15.5 %


(11.5-14.5)


 


Platelet Count


 


 


 


 155 x10^3/uL


(140-400)


 


Neutrophils (%) (Auto)    70 % (31-73) 


 


Lymphocytes (%) (Auto)    19 % (24-48) 


 


Monocytes (%) (Auto)    8 % (0-9) 


 


Eosinophils (%) (Auto)    3 % (0-3) 


 


Basophils (%) (Auto)    1 % (0-3) 


 


Neutrophils # (Auto)


 


 


 


 5.6 x10^3uL


(1.8-7.7)


 


Lymphocytes # (Auto)


 


 


 


 1.5 x10^3/uL


(1.0-4.8)


 


Monocytes # (Auto)


 


 


 


 0.6 x10^3/uL


(0.0-1.1)


 


Eosinophils # (Auto)


 


 


 


 0.3 x10^3/uL


(0.0-0.7)


 


Basophils # (Auto)


 


 


 


 0.0 x10^3/uL


(0.0-0.2)


 


Creatine Kinase


 


 


 


 6469 U/L


()


 


Test


 8/27/18


07:42 


 


 





 


Glucose (Fingerstick)


 162 mg/dL


(70-99) 


 


 














Objective:


Assessment:


GPC bacteremia 1/2 ID and SERGE pending


bilateral foot and leg lesions


Meth use


Rhabdomyolysis 


poorly controlled dm





Plan:


Plan of Care


[id number]





thanks





cont empiric IV Vanc and Rocephin


f/u final id and serge of GPC in blood


local wound care











CALLI GASTELUM MD Aug 27, 2018 11:04

## 2018-08-27 NOTE — PDOC
PROGRESS NOTES


Chief Complaint


Chief Complaint


AMS, metabolic and toxic encephalopathy


drug abuse with meth


hyperthermia , resolved


LEV vasomotor


DM2 on insulin


rhabdomyolysis


elevated CE with lev, demanding ischemia


tobaccoism


leukocytosis, SIRS


LACTATE acidosis


hypomagnesemia


down syndrome


schizophrenia


Hyponatremia


Hypokalemia mild





History of Present Illness


History of Present Illness


Transferred out of ICU 8/25/18 after an overnight stay


Mentation now back to normal


Patient admits to methamphetamine use


Blisters on bilateral soles of the feet, was wearing sandals. Pictures on chart

, I have reviewed


I have reviewed wounds personally with ID at bedside





CK better, from [id number] on admission to [id number] now


NO other issues





Plan: 


CONt iVF for the rhabdo


ID involved bec of GPC 1.2 bottles


On IV rocpehin


Follow cultures


Supportive tx


Recheck cpk tmr


Dw RN and iD





Vitals


Vitals





Vital Signs








  Date Time  Temp Pulse Resp B/P (MAP) Pulse Ox O2 Delivery O2 Flow Rate FiO2


 


8/27/18 11:12 98.1 66 18 107/67 (80) 97 Room Air  





 98.1       


 


8/27/18 08:00       2.0 











Physical Exam


General:  Alert, Oriented X3, Cooperative


Heart:  Regular rate, Normal S1, Normal S2


Lungs:  Clear


Abdomen:  Normal bowel sounds, Soft


Skin:  Other (blisters and  redness on bilateral soles of the feet, dressing in 

place)





Labs


LABS





Laboratory Tests








Test


 8/26/18


11:44 8/26/18


16:45 8/26/18


20:36 8/27/18


03:15


 


Glucose (Fingerstick)


 156 mg/dL


(70-99) 151 mg/dL


(70-99) 151 mg/dL


(70-99) 





 


White Blood Count


 


 


 


 8.0 x10^3/uL


(4.0-11.0)


 


Red Blood Count


 


 


 


 4.25 x10^6/uL


(4.30-5.70)


 


Hemoglobin


 


 


 


 12.1 g/dL


(13.0-17.5)


 


Hematocrit


 


 


 


 35.6 %


(39.0-53.0)


 


Mean Corpuscular Volume    84 fL () 


 


Mean Corpuscular Hemoglobin    29 pg (25-35) 


 


Mean Corpuscular Hemoglobin


Concent 


 


 


 34 g/dL


(31-37)


 


Red Cell Distribution Width


 


 


 


 15.5 %


(11.5-14.5)


 


Platelet Count


 


 


 


 155 x10^3/uL


(140-400)


 


Neutrophils (%) (Auto)    70 % (31-73) 


 


Lymphocytes (%) (Auto)    19 % (24-48) 


 


Monocytes (%) (Auto)    8 % (0-9) 


 


Eosinophils (%) (Auto)    3 % (0-3) 


 


Basophils (%) (Auto)    1 % (0-3) 


 


Neutrophils # (Auto)


 


 


 


 5.6 x10^3uL


(1.8-7.7)


 


Lymphocytes # (Auto)


 


 


 


 1.5 x10^3/uL


(1.0-4.8)


 


Monocytes # (Auto)


 


 


 


 0.6 x10^3/uL


(0.0-1.1)


 


Eosinophils # (Auto)


 


 


 


 0.3 x10^3/uL


(0.0-0.7)


 


Basophils # (Auto)


 


 


 


 0.0 x10^3/uL


(0.0-0.2)


 


Creatine Kinase


 


 


 


 6469 U/L


()


 


Test


 8/27/18


07:42 


 


 





 


Glucose (Fingerstick)


 162 mg/dL


(70-99) 


 


 














Review of Systems


Review of Systems


A 14 point ROS was completed with the following noted as positive:





Other systems reviewed and negative.


\CONSTITUTIONAL:        No fever or chills


EYES:                          No recent changes


SKIN:               No rash or itching


CARDIOVASCULAR:     No chest pain, syncope, palpitations, or edema


RESPIRATORY:            No SOB or cough


GASTROINTESTINAL:    No nausea, vomiting or abdominal pain


NEUROLOGICAL:          No headaches or weakness


ENDOCRINE:               No cold or heat intolerance


GENITOURINARY:         No urgency or frequency of urination


MUSCULOSKELETAL:   No back pain or joint pain


LYMPHATICS:               No enlarged lymph nodes


PSYCHIATRIC:              No anxiety or depression





Assessment and Plan


Assessmemt and Plan


Problems


Medical Problems:


(1) Acute renal failure


Status: Acute  





(2) Fever 106 degrees F or over


Status: Acute  





(3) Heat exhaustion


Status: Acute  





(4) Methamphetamine abuse


Status: Acute  











Comment


Review of Relevant


I have reviewed the following items karlie (where applicable) has been applied.


Labs





Laboratory Tests








Test


 8/25/18


12:02 8/25/18


17:01 8/25/18


20:47 8/26/18


04:35


 


Glucose (Fingerstick)


 281 mg/dL


(70-99) 232 mg/dL


(70-99) 171 mg/dL


(70-99) 





 


White Blood Count


 


 


 


 9.9 x10^3/uL


(4.0-11.0)


 


Red Blood Count


 


 


 


 3.89 x10^6/uL


(4.30-5.70)


 


Hemoglobin


 


 


 


 11.2 g/dL


(13.0-17.5)


 


Hematocrit


 


 


 


 33.7 %


(39.0-53.0)


 


Mean Corpuscular Volume    87 fL () 


 


Mean Corpuscular Hemoglobin    29 pg (25-35) 


 


Mean Corpuscular Hemoglobin


Concent 


 


 


 33 g/dL


(31-37)


 


Red Cell Distribution Width


 


 


 


 16.4 %


(11.5-14.5)


 


Platelet Count


 


 


 


 149 x10^3/uL


(140-400)


 


Neutrophils (%) (Auto)    76 % (31-73) 


 


Lymphocytes (%) (Auto)    16 % (24-48) 


 


Monocytes (%) (Auto)    6 % (0-9) 


 


Eosinophils (%) (Auto)    2 % (0-3) 


 


Basophils (%) (Auto)    0 % (0-3) 


 


Neutrophils # (Auto)


 


 


 


 7.6 x10^3uL


(1.8-7.7)


 


Lymphocytes # (Auto)


 


 


 


 1.6 x10^3/uL


(1.0-4.8)


 


Monocytes # (Auto)


 


 


 


 0.6 x10^3/uL


(0.0-1.1)


 


Eosinophils # (Auto)


 


 


 


 0.2 x10^3/uL


(0.0-0.7)


 


Basophils # (Auto)


 


 


 


 0.0 x10^3/uL


(0.0-0.2)


 


Sodium Level


 


 


 


 132 mmol/L


(136-145)


 


Potassium Level


 


 


 


 3.4 mmol/L


(3.5-5.1)


 


Chloride Level


 


 


 


 106 mmol/L


()


 


Carbon Dioxide Level


 


 


 


 17 mmol/L


(21-32)


 


Anion Gap    9 (6-14) 


 


Blood Urea Nitrogen


 


 


 


 18 mg/dL


(8-26)


 


Creatinine


 


 


 


 0.9 mg/dL


(0.7-1.3)


 


Estimated GFR


(Cockcroft-Gault) 


 


 


 92.1 





 


Glucose Level


 


 


 


 136 mg/dL


(70-99)


 


Calcium Level


 


 


 


 8.1 mg/dL


(8.5-10.1)


 


Creatine Kinase


 


 


 


 14960 U/L


()


 


Creatine Kinase MB (Mass)


 


 


 


 106.5 ng/mL


(0.0-3.6)


 


Creatine Kinase MB Relative


Index 


 


 


 0.8 % (0-4) 





 


Test


 8/26/18


07:08 8/26/18


11:44 8/26/18


16:45 8/26/18


20:36


 


Glucose (Fingerstick)


 153 mg/dL


(70-99) 156 mg/dL


(70-99) 151 mg/dL


(70-99) 151 mg/dL


(70-99)


 


Test


 8/27/18


03:15 8/27/18


07:42 


 





 


White Blood Count


 8.0 x10^3/uL


(4.0-11.0) 


 


 





 


Red Blood Count


 4.25 x10^6/uL


(4.30-5.70) 


 


 





 


Hemoglobin


 12.1 g/dL


(13.0-17.5) 


 


 





 


Hematocrit


 35.6 %


(39.0-53.0) 


 


 





 


Mean Corpuscular Volume 84 fL ()    


 


Mean Corpuscular Hemoglobin 29 pg (25-35)    


 


Mean Corpuscular Hemoglobin


Concent 34 g/dL


(31-37) 


 


 





 


Red Cell Distribution Width


 15.5 %


(11.5-14.5) 


 


 





 


Platelet Count


 155 x10^3/uL


(140-400) 


 


 





 


Neutrophils (%) (Auto) 70 % (31-73)    


 


Lymphocytes (%) (Auto) 19 % (24-48)    


 


Monocytes (%) (Auto) 8 % (0-9)    


 


Eosinophils (%) (Auto) 3 % (0-3)    


 


Basophils (%) (Auto) 1 % (0-3)    


 


Neutrophils # (Auto)


 5.6 x10^3uL


(1.8-7.7) 


 


 





 


Lymphocytes # (Auto)


 1.5 x10^3/uL


(1.0-4.8) 


 


 





 


Monocytes # (Auto)


 0.6 x10^3/uL


(0.0-1.1) 


 


 





 


Eosinophils # (Auto)


 0.3 x10^3/uL


(0.0-0.7) 


 


 





 


Basophils # (Auto)


 0.0 x10^3/uL


(0.0-0.2) 


 


 





 


Creatine Kinase


 6469 U/L


() 


 


 





 


Glucose (Fingerstick)


 


 162 mg/dL


(70-99) 


 











Laboratory Tests








Test


 8/26/18


11:44 8/26/18


16:45 8/26/18


20:36 8/27/18


03:15


 


Glucose (Fingerstick)


 156 mg/dL


(70-99) 151 mg/dL


(70-99) 151 mg/dL


(70-99) 





 


White Blood Count


 


 


 


 8.0 x10^3/uL


(4.0-11.0)


 


Red Blood Count


 


 


 


 4.25 x10^6/uL


(4.30-5.70)


 


Hemoglobin


 


 


 


 12.1 g/dL


(13.0-17.5)


 


Hematocrit


 


 


 


 35.6 %


(39.0-53.0)


 


Mean Corpuscular Volume    84 fL () 


 


Mean Corpuscular Hemoglobin    29 pg (25-35) 


 


Mean Corpuscular Hemoglobin


Concent 


 


 


 34 g/dL


(31-37)


 


Red Cell Distribution Width


 


 


 


 15.5 %


(11.5-14.5)


 


Platelet Count


 


 


 


 155 x10^3/uL


(140-400)


 


Neutrophils (%) (Auto)    70 % (31-73) 


 


Lymphocytes (%) (Auto)    19 % (24-48) 


 


Monocytes (%) (Auto)    8 % (0-9) 


 


Eosinophils (%) (Auto)    3 % (0-3) 


 


Basophils (%) (Auto)    1 % (0-3) 


 


Neutrophils # (Auto)


 


 


 


 5.6 x10^3uL


(1.8-7.7)


 


Lymphocytes # (Auto)


 


 


 


 1.5 x10^3/uL


(1.0-4.8)


 


Monocytes # (Auto)


 


 


 


 0.6 x10^3/uL


(0.0-1.1)


 


Eosinophils # (Auto)


 


 


 


 0.3 x10^3/uL


(0.0-0.7)


 


Basophils # (Auto)


 


 


 


 0.0 x10^3/uL


(0.0-0.2)


 


Creatine Kinase


 


 


 


 6469 U/L


()


 


Test


 8/27/18


07:42 


 


 





 


Glucose (Fingerstick)


 162 mg/dL


(70-99) 


 


 











Microbiology


8/25/18 Blood Culture - Final, Complete


Medications





Current Medications


Sodium Chloride 1,000 ml @  1,000 mls/hr Q1H IV  Last administered on 8/25/18at 

04:06;  Start 8/25/18 at 04:00;  Stop 8/25/18 at 04:59;  Status DC


Lorazepam (Ativan) 2 mg 1X  ONCE IV  Last administered on 8/25/18at 04:06;  

Start 8/25/18 at 04:00;  Stop 8/25/18 at 04:05;  Status DC


Lorazepam (Ativan) 2 mg 1X  ONCE IV  Last administered on 8/25/18at 04:23;  

Start 8/25/18 at 04:30;  Stop 8/25/18 at 04:32;  Status DC


Acetaminophen (Tylenol) 1,000 mg 1X  ONCE PO  Last administered on 8/25/18at 05:

00;  Start 8/25/18 at 05:00;  Stop 8/25/18 at 05:01;  Status DC


Lorazepam (Ativan) 2 mg 1X  ONCE IV ;  Start 8/25/18 at 05:00;  Stop 8/25/18 at 

05:01;  Status DC


Ondansetron HCl (Zofran) 4 mg PRN Q8HRS  PRN IV NAUSEA/VOMITING;  Start 8/25/18 

at 05:30;  Stop 8/26/18 at 05:29;  Status DC


Sodium Chloride 1,000 ml @  250 mls/hr Q4H IV  Last administered on 8/25/18at 20

:56;  Start 8/25/18 at 05:18;  Stop 8/26/18 at 05:17;  Status DC


Sodium Chloride 1,000 ml @  1,000 mls/hr 1X  ONCE IV  Last administered on 8/25/ 18at 08:15;  Start 8/25/18 at 08:15;  Stop 8/25/18 at 09:14;  Status DC


Sodium Chloride 1,000 ml @  1,000 mls/hr 1X  ONCE IV  Last administered on 8/25/ 18at 08:15;  Start 8/25/18 at 08:15;  Stop 8/25/18 at 09:14;  Status DC


Insulin Human Lispro (HumaLOG) 0-9 UNITS TIDWMEALS SQ  Last administered on 8/26 /18at 08:18;  Start 8/25/18 at 12:00


Dextrose (Dextrose 50%-Water Syringe) 12.5 gm PRN Q15MIN  PRN IV SEE COMMENTS;  

Start 8/25/18 at 08:15


Nicotine (Nicoderm Cq 21mg) 1 patch PRN DAILY  PRN TD SMOKING CESSATION Last 

administered on 8/26/18at 16:26;  Start 8/25/18 at 11:30


Potassium Chloride (Klor-Con) 40 meq 1X  ONCE PO  Last administered on 8/25/ 18at 12:43;  Start 8/25/18 at 12:00;  Stop 8/25/18 at 12:01;  Status DC


Acetaminophen (Tylenol) 650 mg PRN Q6HRS  PRN PO FEVER;  Start 8/25/18 at 13:15


Ondansetron HCl (Zofran) 4 mg PRN Q6HRS  PRN IV NAUSEA/VOMITING;  Start 8/25/18 

at 13:15


Morphine Sulfate (Morphine Sulfate) 2 mg PRN Q2HR  PRN IV MODERATE TO SEVERE 

PAIN Last administered on 8/26/18at 21:50;  Start 8/25/18 at 13:15


Tramadol HCl (Ultram) 50 mg PRN Q6HRS  PRN PO MILD TO MODERATE PAIN Last 

administered on 8/27/18at 06:24;  Start 8/25/18 at 13:15


Docusate Sodium (Colace) 100 mg PRN DAILY  PRN PO CONSTIPATION;  Start 8/25/18 

at 13:15


Heparin Sodium (Porcine) (Heparin Sq) 5,000 unit Q8HRS SQ  Last administered on 

8/27/18at 06:22;  Start 8/25/18 at 14:00


Insulin Glargine (Lantus) 20 units QHS SQ  Last administered on 8/26/18at 21:52

;  Start 8/25/18 at 21:00


Vancomycin HCl (Vanco Per Pharmacy) 1 each PRN DAILY  PRN MC SEE COMMENTS;  

Start 8/26/18 at 09:00;  Stop 8/26/18 at 09:01;  Status DC


Ceftriaxone Sodium 1 gm/ Dextrose 50 ml @  100 mls/hr Q24H IV ;  Start 8/26/18 

at 09:00;  Stop 8/26/18 at 09:08;  Status DC


Ceftriaxone Sodium (Rocephin) 1 gm Q24H IVP  Last administered on 8/26/18at 11:

56;  Start 8/26/18 at 10:00


Vancomycin HCl (Vanco Per Pharmacy) 1 each PRN DAILY  PRN MC SEE COMMENTS Last 

administered on 8/26/18at 14:18;  Start 8/26/18 at 12:15


Vancomycin HCl 2 gm/Sodium Chloride 500 ml @  250 mls/hr 1X  ONCE IV  Last 

administered on 8/26/18at 13:58;  Start 8/26/18 at 13:00;  Stop 8/26/18 at 14:59

;  Status DC


Sodium Chloride 1,000 ml @  125 mls/hr Q8H IV  Last administered on 8/26/18at 21

:51;  Start 8/26/18 at 13:00


Vancomycin HCl 1.5 gm/Sodium Chloride 500 ml @  250 mls/hr Q12H IV  Last 

administered on 8/27/18at 02:06;  Start 8/27/18 at 02:00


Vancomycin HCl (Vancomycin Trough Level) 1 each 1X  ONCE MC ;  Start 8/28/18 at 

13:30;  Stop 8/28/18 at 13:31


Lactobacillus Rhamnosus (Culturelle) 1 cap BID PO  Last administered on 8/27/ 18at 11:12;  Start 8/26/18 at 21:00





Active Scripts


Active


Reported


Novolog (Insulin Aspart) 100 Unit/1 Ml Cartridge 1 Unit SQ PRN BFRMEAL PRN


Lantus Solostar (Insulin Glargine,Hum.rec.anlog) 100 Unit/1 Ml Insuln.pen 1 

Unit SQ PRN PRN


Trazodone Hcl 50 Mg Tablet 1 Tab PO QHS


Simvastatin 20 Mg Tablet 1 Tab PO QHS


Olanzapine 10 Mg Tablet 1.5 Tab PO QHS


Niaspan (Niacin) 500 Mg Tab.er.24h 1 Tab PO BID


Gemfibrozil 600 Mg Tablet 1 Tab PO BIDWMEALS


Metformin Hcl 1,000 Mg Tablet 1,000 Mg PO BIDWMEALS


Citalopram Hbr (Citalopram Hydrobromide) 40 Mg Tablet 1 Tab PO DAILY


Vitals/I & O





Vital Sign - Last 24 Hours








 8/26/18 8/26/18 8/26/18 8/26/18





 11:59 12:05 12:06 12:37


 


Temp 98.2   





 98.2   


 


Pulse 77   


 


Resp 19   


 


B/P (MAP) 105/58 (74)   


 


Pulse Ox 96 96 96 96


 


O2 Delivery Room Air Room Air Room Air 


 


O2 Flow Rate  2.0 2.0 2.0


 


    





    





 8/26/18 8/26/18 8/26/18 8/26/18





 16:17 16:25 20:00 21:50


 


Temp 97.5   





 97.5   


 


Pulse 69   


 


Resp 19   


 


B/P (MAP) 97/56 (70)   


 


Pulse Ox 95   


 


O2 Delivery Room Air Room Air Room Air Room Air


 


    





    





 8/26/18 8/26/18 8/27/18 8/27/18





 22:39 23:00 03:00 06:24


 


Temp  96.4 97.0 





  96.4 97.0 


 


Pulse  75 68 


 


Resp  18 18 


 


B/P (MAP)  99/67 (78) 109/68 (82) 


 


Pulse Ox  96 97 


 


O2 Delivery Room Air Room Air Room Air Room Air


 


    





    





 8/27/18 8/27/18 8/27/18 8/27/18





 07:27 07:54 08:00 11:12


 


Temp  97.5  98.1





  97.5  98.1


 


Pulse  70  66


 


Resp  20  18


 


B/P (MAP)  102/68 (79)  107/67 (80)


 


Pulse Ox 97 96  97


 


O2 Delivery Room Air Room Air Room Air Room Air


 


O2 Flow Rate 2.0  2.0 














Intake and Output   


 


 8/26/18 8/26/18 8/27/18





 15:00 23:00 07:00


 


Intake Total  540 ml 


 


Output Total  2450 ml 


 


Balance  -1910 ml 

















PARTH OVERTON MD Aug 27, 2018 11:39

## 2018-08-27 NOTE — CARD
MR#: [id number]

Account#: [id number]

Accession#: [id number]

Date of Study: 08/26/2018

Ordering Physician: TITA WOODARD, 

Referring Physician: PARTH OVERTON 

Tech: ANTOINETTE Flynn





--------------- APPROVED REPORT --------------





EXAM: Two-dimensional and M-mode echocardiogram with Doppler and color Doppler.



Other Information 

Quality : AverageHR: 82bpm



INDICATION

Elevated Troponin



RISK FACTORS

Obesity   



2D DIMENSIONS 

RVDd3.5 (2.9-3.5cm)Left Atrium(2D)3.2 (1.6-4.0cm)

IVSd1.0 (0.7-1.1cm)Aortic Root(2D)3.2 (2.0-3.7cm)

LVDd5.1 (3.9-5.9cm)LVOT Diameter2.2 (1.8-2.4cm)

PWd1.1 (0.7-1.1cm)LVDs3.7 (2.5-4.0cm)

FS (%) 26.4 %SV63.2 ml

LVEF(%)51.4 (>50%)



Aortic Valve

AoV Peak Kwaku.145.3cm/sAoV VTI27.1cm

AO Peak GR.8.4mmHgLVOT Peak Kwaku.76.0cm/s

LVOT  VTI 15.45cmAO Mean GR.5mmHg

ARNOLD (VMAX)1.30sv6CJH   (VTI)2.11cm2

AI P 1/2 [id number]



Mitral Valve

MV E Nkqekqip61.7cm/sMV DECEL [id number]

MV A Hfawjwck56.9cm/sMV [id number]

E/A  Ratio0.9MVA (PHT)3.66cm2



Pulmonary Valve

PV Peak Ixxwoshj33.4cm/sPV Peak Grad.3mmHg



Tricuspid Valve

TR P. Aiteajrg612yw/sRAP [id number]

TR Peak Gr.[id number]



 LEFT VENTRICLE 

The left ventricle is normal size. There is normal left ventricular wall thickness. The left ventricu
lar systolic function is normal and the ejection fraction is within normal range. EF 55% There is nor
mal LV segmental wall motion. The left ventricular diastolic function and filling is normal for age.



 RIGHT VENTRICLE 

The right ventricle is normal size. The right ventricular systolic function is normal.



 ATRIA 

The left atrium size is normal. The right atrium size is normal. The interatrial septum is intact wit
h no evidence for an atrial septal defect or patent foramen ovale as noted on 2-D or Doppler imaging.




 AORTIC VALVE 

The aortic valve is thickened but opens well. Doppler and Color Flow revealed trace to mild aortic re
gurgitation. There is no significant aortic valvular stenosis. There is no aortic valvular vegetation
.



 MITRAL VALVE 

The mitral valve is normal in structure. There is no evidence of mitral valve prolapse. There is no m
itral valve stenosis. Doppler and Color-flow revealed trace mitral regurgitation.



 TRICUSPID VALVE 

The tricuspid valve is not well visualized. Doppler and Color Flow revealed trace to mild tricuspid r
egurgitation. There is no pulmonary hypertension. The PA pressure was estimated at 22 mmHg. There is 
no tricuspid valve stenosis.



 PULMONIC VALVE 

The pulmonic valve is not well visualized. Doppler and Color Flow revealed no pulmonic valvular regur
gitation. There is no pulmonic valvular stenosis.



 GREAT VESSELS 

The aortic root is normal in size. The IVC is normal in size and collapses >50% with inspiration.



 PERICARDIAL EFFUSION 

There is no pleural effusion. There is no evidence of significant pericardial effusion.



Critical Notification

Critical Value: No



<Conclusion>

The left ventricular systolic function is normal and the ejection fraction is within normal range. EF
 55%

There is normal LV segmental wall motion.



Signed by : Tita Woodard, 

Electronically Approved : 08/27/2018 09:17:00

## 2018-08-28 VITALS — DIASTOLIC BLOOD PRESSURE: 74 MMHG | SYSTOLIC BLOOD PRESSURE: 108 MMHG

## 2018-08-28 VITALS — DIASTOLIC BLOOD PRESSURE: 69 MMHG | SYSTOLIC BLOOD PRESSURE: 101 MMHG

## 2018-08-28 VITALS — DIASTOLIC BLOOD PRESSURE: 77 MMHG | SYSTOLIC BLOOD PRESSURE: 120 MMHG

## 2018-08-28 VITALS — DIASTOLIC BLOOD PRESSURE: 78 MMHG | SYSTOLIC BLOOD PRESSURE: 116 MMHG

## 2018-08-28 VITALS — DIASTOLIC BLOOD PRESSURE: 72 MMHG | SYSTOLIC BLOOD PRESSURE: 107 MMHG

## 2018-08-28 VITALS — SYSTOLIC BLOOD PRESSURE: 110 MMHG | DIASTOLIC BLOOD PRESSURE: 67 MMHG

## 2018-08-28 LAB
CK SERPL-CCNC: 1983 U/L (ref 39–308)
CREAT SERPL-MCNC: 0.7 MG/DL (ref 0.7–1.3)
GFR SERPLBLD BASED ON 1.73 SQ M-ARVRAT: 123.1 ML/MIN
VANC TR: 11 MCG/ML (ref 10–20)

## 2018-08-28 RX ADMIN — BACITRACIN SCH MLS/HR: 5000 INJECTION, POWDER, FOR SOLUTION INTRAMUSCULAR at 21:45

## 2018-08-28 RX ADMIN — MORPHINE SULFATE PRN MG: 2 INJECTION, SOLUTION INTRAMUSCULAR; INTRAVENOUS at 10:58

## 2018-08-28 RX ADMIN — INSULIN LISPRO SCH UNITS: 100 INJECTION, SOLUTION INTRAVENOUS; SUBCUTANEOUS at 12:12

## 2018-08-28 RX ADMIN — HEPARIN SODIUM SCH UNIT: 5000 INJECTION, SOLUTION INTRAVENOUS; SUBCUTANEOUS at 14:34

## 2018-08-28 RX ADMIN — INSULIN LISPRO SCH UNITS: 100 INJECTION, SOLUTION INTRAVENOUS; SUBCUTANEOUS at 17:00

## 2018-08-28 RX ADMIN — Medication SCH CAP: at 21:04

## 2018-08-28 RX ADMIN — HEPARIN SODIUM SCH UNIT: 5000 INJECTION, SOLUTION INTRAVENOUS; SUBCUTANEOUS at 21:11

## 2018-08-28 RX ADMIN — BACITRACIN SCH MLS/HR: 5000 INJECTION, POWDER, FOR SOLUTION INTRAMUSCULAR at 11:03

## 2018-08-28 RX ADMIN — CEFTRIAXONE SCH GM: 1 INJECTION, POWDER, FOR SOLUTION INTRAMUSCULAR; INTRAVENOUS at 11:02

## 2018-08-28 RX ADMIN — VANCOMYCIN HYDROCHLORIDE SCH MLS/HR: 1 INJECTION, POWDER, FOR SOLUTION INTRAVENOUS at 23:21

## 2018-08-28 RX ADMIN — MORPHINE SULFATE PRN MG: 2 INJECTION, SOLUTION INTRAMUSCULAR; INTRAVENOUS at 04:03

## 2018-08-28 RX ADMIN — MORPHINE SULFATE PRN MG: 2 INJECTION, SOLUTION INTRAMUSCULAR; INTRAVENOUS at 02:02

## 2018-08-28 RX ADMIN — Medication SCH CAP: at 08:34

## 2018-08-28 RX ADMIN — INSULIN GLARGINE SCH UNITS: 100 INJECTION, SOLUTION SUBCUTANEOUS at 21:10

## 2018-08-28 RX ADMIN — VANCOMYCIN HYDROCHLORIDE SCH MLS/HR: 1 INJECTION, POWDER, FOR SOLUTION INTRAVENOUS at 02:01

## 2018-08-28 RX ADMIN — VANCOMYCIN HYDROCHLORIDE SCH MLS/HR: 1 INJECTION, POWDER, FOR SOLUTION INTRAVENOUS at 14:26

## 2018-08-28 RX ADMIN — INSULIN LISPRO SCH UNITS: 100 INJECTION, SOLUTION INTRAVENOUS; SUBCUTANEOUS at 08:40

## 2018-08-28 RX ADMIN — BACITRACIN SCH MLS/HR: 5000 INJECTION, POWDER, FOR SOLUTION INTRAMUSCULAR at 05:00

## 2018-08-28 RX ADMIN — VANCOMYCIN HYDROCHLORIDE PRN EACH: 1 INJECTION, POWDER, LYOPHILIZED, FOR SOLUTION INTRAVENOUS at 12:15

## 2018-08-28 RX ADMIN — MORPHINE SULFATE PRN MG: 2 INJECTION, SOLUTION INTRAMUSCULAR; INTRAVENOUS at 18:25

## 2018-08-28 RX ADMIN — HEPARIN SODIUM SCH UNIT: 5000 INJECTION, SOLUTION INTRAVENOUS; SUBCUTANEOUS at 05:53

## 2018-08-28 RX ADMIN — VANCOMYCIN HYDROCHLORIDE PRN EACH: 1 INJECTION, POWDER, LYOPHILIZED, FOR SOLUTION INTRAVENOUS at 15:49

## 2018-08-28 NOTE — PDOC
Infectious Disease Note


Subjective:


Subjective


pt says feels ok


requesting morphine for chronic pain


no f/c/n/v/d though says feels hot off and on





ROS:


ROS


Negative except for above.





Vital Signs:


Vital Signs





Vital Signs








  Date Time  Temp Pulse Resp B/P (MAP) Pulse Ox O2 Delivery O2 Flow Rate FiO2


 


8/28/18 07:39 97.9 75 20 116/78 (91) 98 Room Air  





 97.9       


 


8/27/18 08:00       2.0 











Physical Exam:


PHYSICAL EXAM





GENERAL:  Alert, awake male, in no acute distress, cooperative, appropriate


mood.


HEENT:  Normocephalic, atraumatic, anicteric.  No thrush.  Oropharynx clear and


moist.


NECK:  Supple.


LUNGS:  Clear bilaterally.


HEART:  S1, S2, no murmurs.


ABDOMEN:  Soft, nontender, nondistended.  Bowel sounds present.


EXTREMITIES:  No cyanosis, no clubbing.


PSYCHIATRIC:  Appropriate mood and affect.


SKIN:  Bilateral lower extremity dressing intact, removed, numerous blisters and


superficial abrasions present both soles and anterior leg shin and no evidence


of secondary bacterial infection.


NEUROLOGIC:  Alert and oriented.  Grossly nonfocal, appropriate speech.





Medications:


Inpatient Meds:





Current Medications








 Medications


  (Trade)  Dose


 Ordered  Sig/Roxy  Start Time


 Stop Time Status Last Admin


Dose Admin


 


 Acetaminophen


  (Tylenol)  650 mg  PRN Q6HRS  PRN  8/25/18 13:15


     





 


 Alprazolam


  (Xanax)  0.5 mg  PRN Q6HRS  PRN  8/27/18 14:15


    8/27/18 19:18


0.5 MG


 


 Ceftriaxone


 Sodium 1 gm/


 Dextrose  50 ml @ 


 100 mls/hr  Q24H  8/26/18 09:00


 8/26/18 09:08 DC  





 


 Ceftriaxone Sodium


  (Rocephin)  1 gm  Q24H  8/26/18 10:00


    8/27/18 11:45


1 GM


 


 Dextrose


  (Dextrose


 50%-Water Syringe)  12.5 gm  PRN Q15MIN  PRN  8/25/18 08:15


     





 


 Docusate Sodium


  (Colace)  100 mg  PRN DAILY  PRN  8/25/18 13:15


     





 


 Heparin Sodium


  (Porcine)


  (Heparin Sq)  5,000 unit  Q8HRS  8/25/18 14:00


    8/28/18 05:53


5,000 UNIT


 


 Insulin Glargine


  (Lantus)  20 units  QHS  8/25/18 21:00


    8/27/18 20:57


20 UNITS


 


 Insulin Human


 Lispro


  (HumaLOG)  0-9 UNITS  TIDWMEALS  8/25/18 12:00


    8/28/18 08:40


4 UNITS


 


 Lactobacillus


 Rhamnosus


  (Culturelle)  1 cap  BID  8/26/18 21:00


    8/28/18 08:34


1 CAP


 


 Lorazepam


  (Ativan)  2 mg  PRN Q4HRS  PRN  8/27/18 14:15


     





 


 Morphine Sulfate


  (Morphine


 Sulfate)  2 mg  PRN Q2HR  PRN  8/25/18 13:15


    8/28/18 04:03


2 MG


 


 Nicotine


  (Nicoderm Cq


 21mg)  1 patch  PRN DAILY  PRN  8/25/18 11:30


    8/27/18 13:30


1 PATCH


 


 Ondansetron HCl


  (Zofran)  4 mg  PRN Q6HRS  PRN  8/25/18 13:15


     





 


 Potassium Chloride


  (Klor-Con)  40 meq  1X  ONCE  8/25/18 12:00


 8/25/18 12:01 DC 8/25/18 12:43


40 MEQ


 


 Sodium Chloride  1,000 ml @ 


 125 mls/hr  Q8H  8/26/18 13:00


    8/27/18 20:53


125 MLS/HR


 


 Tramadol HCl


  (Ultram)  50 mg  PRN Q6HRS  PRN  8/25/18 13:15


    8/27/18 06:24


50 MG


 


 Vancomycin HCl


  (Vanco Per


 Pharmacy)  1 each  PRN DAILY  PRN  8/26/18 12:15


    8/27/18 14:57


1 EACH


 


 Vancomycin HCl


  (Vancomycin


 Trough Level)  1 each  1X  ONCE  8/28/18 13:30


 8/28/18 13:31   





 


 Vancomycin HCl


 1.5 gm/Sodium


 Chloride  500 ml @ 


 250 mls/hr  Q12H  8/27/18 02:00


    8/28/18 02:01


250 MLS/HR


 


 Vancomycin HCl 2


 gm/Sodium Chloride  500 ml @ 


 250 mls/hr  1X  ONCE  8/26/18 13:00


 8/26/18 14:59 DC 8/26/18 13:58


250 MLS/HR











Labs:


Lab





Laboratory Tests








Test


 8/27/18


11:49 8/27/18


17:09 8/27/18


20:27 8/28/18


03:10


 


Glucose (Fingerstick)


 156 mg/dL


(70-99) 161 mg/dL


(70-99) 153 mg/dL


(70-99) 





 


Erythrocyte Sedimentation Rate    88 (0-15) 


 


Creatinine


 


 


 


 0.7 mg/dL


(0.7-1.3)


 


Estimated GFR


(Cockcroft-Gault) 


 


 


 123.1 





 


Creatine Kinase


 


 


 


 1983 U/L


()


 


Test


 8/28/18


07:29 


 


 





 


Glucose (Fingerstick)


 171 mg/dL


(70-99) 


 


 











Micro


Aug 25 2018 


BC GPC ID and HIRAL pending





Objective:


Assessment:





1.  Fever, resolved.


2.  Altered mental status, metabolic and toxic encephalopathy improving.  CT


negative for acute changes, though limited.


3.  Drug abuse with methamphetamine.


4.  Blood culture 1 out of 2 bottles positive with GPC-ID and HIRAL pending at


this time.


5.  Hyperthermia, likely from heat exhaustion, resolved.


6.  Numerous blisters both lower extremities, could be the source for GPC,


improving.


7.  Acute kidney injury with rhabdo.improving


8.  Borderline elevation of liver function tests from rhabdo.


9.  Diabetes mellitus 2.


10.  Tobaccoism.


11.  Lactic acidosis, resolved.


12.  Hypomagnesemia.


13.  Down syndrome.


14.  Schizophrenia.


15.  Hyponatremia, hypokalemia, hypomagnesemia, resolved.


16.  Elevated troponin and CK.  No clear cardiac etiology.  Echo is within


normal limits.





Plan:


Plan of Care





1.  Continue empiric vancomycin and Rocephin. Monitor renal functions closely


2.  Continue local wound care for both lower extremities.


3.  Optimal diabetes control as you are doing.


4.  Follow up cultures and final ID of GPC 1/2 bottles from 08/25/2018,


5.  Continue supportive care.


7.  Adjust dose of vancomycin as needed.











CALLI GASTELUM MD Aug 28, 2018 10:20

## 2018-08-28 NOTE — PDOC
PROGRESS NOTES


Chief Complaint


Chief Complaint


AMS, metabolic and toxic encephalopathy


drug abuse with meth


hyperthermia , resolved


LEV vasomotor


DM2 on insulin


rhabdomyolysis


elevated CE with lev, demanding ischemia


tobaccoism


leukocytosis, SIRS


LACTATE acidosis


hypomagnesemia


down syndrome


schizophrenia


Hyponatremia


Hypokalemia mild


GPC bacteremia 1 out of 2 bottles





History of Present Illness


History of Present Illness


Transferred out of ICU 8/25/18 after an overnight stay


Mentation now back to normal


Patient admits to methamphetamine use


Blisters on bilateral soles of the feet, was wearing sandals. Pictures on chart

, I have reviewed


I have reviewed wounds personally with ID at bedside





CK continues to improve,now down to 2000.


I did get a call about shakiness, possibly having withdrawal from his record 

occasional drug use. He does smoke but denies alcohol


I needed to start some Ativan and that seems to be helping





Plan: Continue Ativan 2 weeks IV every 4 for the rec drug withdrawal sxs


Continue vancomycin and Rocephin - GPC 1 out of 2 bottles final-ID on board


Continue IV fluids aggressive and recheck CK again tomorrow


Add PTOT this time-has not ambulated because of muscle aches and also bilateral 

soles.


Increase Xanax from 0.5 to 1 mg by mouth





ff up GPC speciation





Vitals


Vitals





Vital Signs








  Date Time  Temp Pulse Resp B/P (MAP) Pulse Ox O2 Delivery O2 Flow Rate FiO2


 


8/28/18 10:58     98 Room Air  


 


8/28/18 08:00       2.0 


 


8/28/18 07:39 97.9 75 20 116/78 (91)    





 97.9       











Physical Exam


Physical Exam





GENERAL:  Alert, awake male, in no acute distress, cooperative, appropriate


mood.


HEENT:  Normocephalic, atraumatic, anicteric.  No thrush.  Oropharynx clear and


moist.


NECK:  Supple.


LUNGS:  Clear bilaterally.


HEART:  S1, S2, no murmurs.


ABDOMEN:  Soft, nontender, nondistended.  Bowel sounds present.


EXTREMITIES:  No cyanosis, no clubbing.


PSYCHIATRIC:  Appropriate mood and affect.


SKIN:  Bilateral lower extremity dressing intact, removed, numerous blisters and


superficial abrasions present both soles and anterior leg shin and no evidence


of secondary bacterial infection.


NEUROLOGIC:  Alert and oriented.  Grossly nonfocal, appropriate speech.


General:  Alert, Oriented X3, Cooperative


Heart:  Regular rate, Normal S1, Normal S2


Lungs:  Clear


Abdomen:  Normal bowel sounds, Soft


Skin:  Other (blisters and  redness on bilateral soles of the feet, dressing in 

place)





Labs


LABS





Laboratory Tests








Test


 8/27/18


11:49 8/27/18


17:09 8/27/18


20:27 8/28/18


03:10


 


Glucose (Fingerstick)


 156 mg/dL


(70-99) 161 mg/dL


(70-99) 153 mg/dL


(70-99) 





 


Erythrocyte Sedimentation Rate    88 (0-15) 


 


Creatinine


 


 


 


 0.7 mg/dL


(0.7-1.3)


 


Estimated GFR


(Cockcroft-Gault) 


 


 


 123.1 





 


Creatine Kinase


 


 


 


 1983 U/L


()


 


Test


 8/28/18


07:29 


 


 





 


Glucose (Fingerstick)


 171 mg/dL


(70-99) 


 


 














Review of Systems


Review of Systems


bilateral soles hurt, muscle aches, shaky- the rest of 14 point negative





Assessment and Plan


Assessmemt and Plan


Problems


Medical Problems:


(1) Acute renal failure


Status: Acute  





(2) Fever 106 degrees F or over


Status: Acute  





(3) Heat exhaustion


Status: Acute  





(4) Methamphetamine abuse


Status: Acute  











Comment


Review of Relevant


I have reviewed the following items karlie (where applicable) has been applied.


Labs





Laboratory Tests








Test


 8/26/18


11:44 8/26/18


16:45 8/26/18


20:36 8/27/18


03:15


 


Glucose (Fingerstick)


 156 mg/dL


(70-99) 151 mg/dL


(70-99) 151 mg/dL


(70-99) 





 


White Blood Count


 


 


 


 8.0 x10^3/uL


(4.0-11.0)


 


Red Blood Count


 


 


 


 4.25 x10^6/uL


(4.30-5.70)


 


Hemoglobin


 


 


 


 12.1 g/dL


(13.0-17.5)


 


Hematocrit


 


 


 


 35.6 %


(39.0-53.0)


 


Mean Corpuscular Volume    84 fL () 


 


Mean Corpuscular Hemoglobin    29 pg (25-35) 


 


Mean Corpuscular Hemoglobin


Concent 


 


 


 34 g/dL


(31-37)


 


Red Cell Distribution Width


 


 


 


 15.5 %


(11.5-14.5)


 


Platelet Count


 


 


 


 155 x10^3/uL


(140-400)


 


Neutrophils (%) (Auto)    70 % (31-73) 


 


Lymphocytes (%) (Auto)    19 % (24-48) 


 


Monocytes (%) (Auto)    8 % (0-9) 


 


Eosinophils (%) (Auto)    3 % (0-3) 


 


Basophils (%) (Auto)    1 % (0-3) 


 


Neutrophils # (Auto)


 


 


 


 5.6 x10^3uL


(1.8-7.7)


 


Lymphocytes # (Auto)


 


 


 


 1.5 x10^3/uL


(1.0-4.8)


 


Monocytes # (Auto)


 


 


 


 0.6 x10^3/uL


(0.0-1.1)


 


Eosinophils # (Auto)


 


 


 


 0.3 x10^3/uL


(0.0-0.7)


 


Basophils # (Auto)


 


 


 


 0.0 x10^3/uL


(0.0-0.2)


 


Creatine Kinase


 


 


 


 6469 U/L


()


 


Test


 8/27/18


07:42 8/27/18


11:49 8/27/18


17:09 8/27/18


20:27


 


Glucose (Fingerstick)


 162 mg/dL


(70-99) 156 mg/dL


(70-99) 161 mg/dL


(70-99) 153 mg/dL


(70-99)


 


Test


 8/28/18


03:10 8/28/18


07:29 


 





 


Erythrocyte Sedimentation Rate 88 (0-15)    


 


Creatinine


 0.7 mg/dL


(0.7-1.3) 


 


 





 


Estimated GFR


(Cockcroft-Gault) 123.1 


 


 


 





 


Creatine Kinase


 1983 U/L


() 


 


 





 


Glucose (Fingerstick)


 


 171 mg/dL


(70-99) 


 











Laboratory Tests








Test


 8/27/18


11:49 8/27/18


17:09 8/27/18


20:27 8/28/18


03:10


 


Glucose (Fingerstick)


 156 mg/dL


(70-99) 161 mg/dL


(70-99) 153 mg/dL


(70-99) 





 


Erythrocyte Sedimentation Rate    88 (0-15) 


 


Creatinine


 


 


 


 0.7 mg/dL


(0.7-1.3)


 


Estimated GFR


(Cockcroft-Gault) 


 


 


 123.1 





 


Creatine Kinase


 


 


 


 1983 U/L


()


 


Test


 8/28/18


07:29 


 


 





 


Glucose (Fingerstick)


 171 mg/dL


(70-99) 


 


 











Microbiology


8/25/18 Blood Culture - Final, Complete


Medications





Current Medications


Sodium Chloride 1,000 ml @  1,000 mls/hr Q1H IV  Last administered on 8/25/18at 

04:06;  Start 8/25/18 at 04:00;  Stop 8/25/18 at 04:59;  Status DC


Lorazepam (Ativan) 2 mg 1X  ONCE IV  Last administered on 8/25/18at 04:06;  

Start 8/25/18 at 04:00;  Stop 8/25/18 at 04:05;  Status DC


Lorazepam (Ativan) 2 mg 1X  ONCE IV  Last administered on 8/25/18at 04:23;  

Start 8/25/18 at 04:30;  Stop 8/25/18 at 04:32;  Status DC


Acetaminophen (Tylenol) 1,000 mg 1X  ONCE PO  Last administered on 8/25/18at 05:

00;  Start 8/25/18 at 05:00;  Stop 8/25/18 at 05:01;  Status DC


Lorazepam (Ativan) 2 mg 1X  ONCE IV ;  Start 8/25/18 at 05:00;  Stop 8/25/18 at 

05:01;  Status DC


Ondansetron HCl (Zofran) 4 mg PRN Q8HRS  PRN IV NAUSEA/VOMITING;  Start 8/25/18 

at 05:30;  Stop 8/26/18 at 05:29;  Status DC


Sodium Chloride 1,000 ml @  250 mls/hr Q4H IV  Last administered on 8/25/18at 20

:56;  Start 8/25/18 at 05:18;  Stop 8/26/18 at 05:17;  Status DC


Sodium Chloride 1,000 ml @  1,000 mls/hr 1X  ONCE IV  Last administered on 8/25/ 18at 08:15;  Start 8/25/18 at 08:15;  Stop 8/25/18 at 09:14;  Status DC


Sodium Chloride 1,000 ml @  1,000 mls/hr 1X  ONCE IV  Last administered on 8/25/ 18at 08:15;  Start 8/25/18 at 08:15;  Stop 8/25/18 at 09:14;  Status DC


Insulin Human Lispro (HumaLOG) 0-9 UNITS TIDWMEALS SQ  Last administered on 8/28 /18at 08:40;  Start 8/25/18 at 12:00


Dextrose (Dextrose 50%-Water Syringe) 12.5 gm PRN Q15MIN  PRN IV SEE COMMENTS;  

Start 8/25/18 at 08:15


Nicotine (Nicoderm Cq 21mg) 1 patch PRN DAILY  PRN TD SMOKING CESSATION Last 

administered on 8/27/18at 13:30;  Start 8/25/18 at 11:30


Potassium Chloride (Klor-Con) 40 meq 1X  ONCE PO  Last administered on 8/25/ 18at 12:43;  Start 8/25/18 at 12:00;  Stop 8/25/18 at 12:01;  Status DC


Acetaminophen (Tylenol) 650 mg PRN Q6HRS  PRN PO FEVER;  Start 8/25/18 at 13:15


Ondansetron HCl (Zofran) 4 mg PRN Q6HRS  PRN IV NAUSEA/VOMITING;  Start 8/25/18 

at 13:15


Morphine Sulfate (Morphine Sulfate) 2 mg PRN Q2HR  PRN IV MODERATE TO SEVERE 

PAIN Last administered on 8/28/18at 10:58;  Start 8/25/18 at 13:15


Tramadol HCl (Ultram) 50 mg PRN Q6HRS  PRN PO MILD TO MODERATE PAIN Last 

administered on 8/27/18at 06:24;  Start 8/25/18 at 13:15


Docusate Sodium (Colace) 100 mg PRN DAILY  PRN PO CONSTIPATION;  Start 8/25/18 

at 13:15


Heparin Sodium (Porcine) (Heparin Sq) 5,000 unit Q8HRS SQ  Last administered on 

8/28/18at 05:53;  Start 8/25/18 at 14:00


Insulin Glargine (Lantus) 20 units QHS SQ  Last administered on 8/27/18at 20:57

;  Start 8/25/18 at 21:00


Vancomycin HCl (Vanco Per Pharmacy) 1 each PRN DAILY  PRN MC SEE COMMENTS;  

Start 8/26/18 at 09:00;  Stop 8/26/18 at 09:01;  Status DC


Ceftriaxone Sodium 1 gm/ Dextrose 50 ml @  100 mls/hr Q24H IV ;  Start 8/26/18 

at 09:00;  Stop 8/26/18 at 09:08;  Status DC


Ceftriaxone Sodium (Rocephin) 1 gm Q24H IVP  Last administered on 8/28/18at 11:

02;  Start 8/26/18 at 10:00


Vancomycin HCl (Vanco Per Pharmacy) 1 each PRN DAILY  PRN MC SEE COMMENTS Last 

administered on 8/27/18at 14:57;  Start 8/26/18 at 12:15


Vancomycin HCl 2 gm/Sodium Chloride 500 ml @  250 mls/hr 1X  ONCE IV  Last 

administered on 8/26/18at 13:58;  Start 8/26/18 at 13:00;  Stop 8/26/18 at 14:59

;  Status DC


Sodium Chloride 1,000 ml @  125 mls/hr Q8H IV  Last administered on 8/28/18at 11

:03;  Start 8/26/18 at 13:00


Vancomycin HCl 1.5 gm/Sodium Chloride 500 ml @  250 mls/hr Q12H IV  Last 

administered on 8/28/18at 02:01;  Start 8/27/18 at 02:00


Vancomycin HCl (Vancomycin Trough Level) 1 each 1X  ONCE MC ;  Start 8/28/18 at 

13:30;  Stop 8/28/18 at 13:31


Lactobacillus Rhamnosus (Culturelle) 1 cap BID PO  Last administered on 8/28/ 18at 08:34;  Start 8/26/18 at 21:00


Lorazepam (Ativan) 2 mg PRN Q4HRS  PRN IV ANXIETY / AGITATION;  Start 8/27/18 

at 14:15


Alprazolam (Xanax) 0.5 mg PRN Q6HRS  PRN PO ANXIETY / AGITATION Last 

administered on 8/27/18at 19:18;  Start 8/27/18 at 14:15;  Stop 8/28/18 at 11:30

;  Status DC


Alprazolam (Xanax) 1 mg PRN Q6HRS  PRN PO ANXIETY / AGITATION;  Start 8/28/18 

at 11:45





Active Scripts


Active


Reported


Novolog (Insulin Aspart) 100 Unit/1 Ml Cartridge 1 Unit SQ PRN BFRMEAL PRN


Lantus Solostar (Insulin Glargine,Hum.rec.anlog) 100 Unit/1 Ml Insuln.pen 1 

Unit SQ PRN PRN


Trazodone Hcl 50 Mg Tablet 1 Tab PO QHS


Simvastatin 20 Mg Tablet 1 Tab PO QHS


Olanzapine 10 Mg Tablet 1.5 Tab PO QHS


Niaspan (Niacin) 500 Mg Tab.er.24h 1 Tab PO BID


Gemfibrozil 600 Mg Tablet 1 Tab PO BIDWMEALS


Metformin Hcl 1,000 Mg Tablet 1,000 Mg PO BIDWMEALS


Citalopram Hbr (Citalopram Hydrobromide) 40 Mg Tablet 1 Tab PO DAILY


Vitals/I & O





Vital Sign - Last 24 Hours








 8/27/18 8/27/18 8/27/18 8/27/18





 15:30 18:36 20:20 23:25


 


Temp 98.1 98.1  98.2





 98.1 98.1  98.2


 


Pulse 75 70  75


 


Resp 20 17  16


 


B/P (MAP) 99/68 (78) 103/61 (75)  111/65 (80)


 


Pulse Ox 97 96  95


 


O2 Delivery Room Air Room Air Room Air Room Air


 


    





    





 8/28/18 8/28/18 8/28/18 8/28/18





 03:34 07:39 08:00 10:58


 


Temp 98.0 97.9  





 98.0 97.9  


 


Pulse 70 75  


 


Resp 16 20  


 


B/P (MAP) 110/67 (81) 116/78 (91)  


 


Pulse Ox 92 98  98


 


O2 Delivery Room Air Room Air Room Air Room Air


 


O2 Flow Rate   2.0 














Intake and Output   


 


 8/27/18 8/27/18 8/28/18





 15:00 23:00 07:00


 


Intake Total  750 ml 


 


Output Total  2100 ml 650 ml


 


Balance  -1350 ml -650 ml

















PARTH OVERTON MD Aug 28, 2018 11:35

## 2018-08-29 VITALS — DIASTOLIC BLOOD PRESSURE: 77 MMHG | SYSTOLIC BLOOD PRESSURE: 108 MMHG

## 2018-08-29 VITALS — DIASTOLIC BLOOD PRESSURE: 58 MMHG | SYSTOLIC BLOOD PRESSURE: 92 MMHG

## 2018-08-29 VITALS — SYSTOLIC BLOOD PRESSURE: 115 MMHG | DIASTOLIC BLOOD PRESSURE: 70 MMHG

## 2018-08-29 VITALS — SYSTOLIC BLOOD PRESSURE: 106 MMHG | DIASTOLIC BLOOD PRESSURE: 63 MMHG

## 2018-08-29 VITALS — SYSTOLIC BLOOD PRESSURE: 139 MMHG | DIASTOLIC BLOOD PRESSURE: 83 MMHG

## 2018-08-29 VITALS — SYSTOLIC BLOOD PRESSURE: 125 MMHG | DIASTOLIC BLOOD PRESSURE: 76 MMHG

## 2018-08-29 LAB
ANION GAP SERPL CALC-SCNC: 5 MMOL/L (ref 6–14)
BASOPHILS # BLD AUTO: 0 X10^3/UL (ref 0–0.2)
BASOPHILS NFR BLD: 0 % (ref 0–3)
BUN SERPL-MCNC: 8 MG/DL (ref 8–26)
CALCIUM SERPL-MCNC: 9 MG/DL (ref 8.5–10.1)
CHLORIDE SERPL-SCNC: 104 MMOL/L (ref 98–107)
CK SERPL-CCNC: 715 U/L (ref 39–308)
CO2 SERPL-SCNC: 25 MMOL/L (ref 21–32)
CREAT SERPL-MCNC: 0.7 MG/DL (ref 0.7–1.3)
EOSINOPHIL NFR BLD: 0.3 X10^3/UL (ref 0–0.7)
EOSINOPHIL NFR BLD: 4 % (ref 0–3)
ERYTHROCYTE [DISTWIDTH] IN BLOOD BY AUTOMATED COUNT: 15 % (ref 11.5–14.5)
GFR SERPLBLD BASED ON 1.73 SQ M-ARVRAT: 123.1 ML/MIN
GLUCOSE SERPL-MCNC: 220 MG/DL (ref 70–99)
HCT VFR BLD CALC: 34.1 % (ref 39–53)
HGB BLD-MCNC: 11.9 G/DL (ref 13–17.5)
LYMPHOCYTES # BLD: 1 X10^3/UL (ref 1–4.8)
LYMPHOCYTES NFR BLD AUTO: 14 % (ref 24–48)
MCH RBC QN AUTO: 29 PG (ref 25–35)
MCHC RBC AUTO-ENTMCNC: 35 G/DL (ref 31–37)
MCV RBC AUTO: 82 FL (ref 79–100)
MONO #: 0.5 X10^3/UL (ref 0–1.1)
MONOCYTES NFR BLD: 7 % (ref 0–9)
NEUT #: 5.2 X10^3UL (ref 1.8–7.7)
NEUTROPHILS NFR BLD AUTO: 75 % (ref 31–73)
PLATELET # BLD AUTO: 168 X10^3/UL (ref 140–400)
POTASSIUM SERPL-SCNC: 3.3 MMOL/L (ref 3.5–5.1)
RBC # BLD AUTO: 4.14 X10^6/UL (ref 4.3–5.7)
SODIUM SERPL-SCNC: 134 MMOL/L (ref 136–145)
WBC # BLD AUTO: 6.9 X10^3/UL (ref 4–11)

## 2018-08-29 RX ADMIN — CEFTRIAXONE SCH GM: 1 INJECTION, POWDER, FOR SOLUTION INTRAMUSCULAR; INTRAVENOUS at 14:21

## 2018-08-29 RX ADMIN — INSULIN GLARGINE SCH UNITS: 100 INJECTION, SOLUTION SUBCUTANEOUS at 20:34

## 2018-08-29 RX ADMIN — VANCOMYCIN HYDROCHLORIDE SCH MLS/HR: 1 INJECTION, POWDER, FOR SOLUTION INTRAVENOUS at 06:02

## 2018-08-29 RX ADMIN — INSULIN LISPRO SCH UNITS: 100 INJECTION, SOLUTION INTRAVENOUS; SUBCUTANEOUS at 09:49

## 2018-08-29 RX ADMIN — BACITRACIN SCH MLS/HR: 5000 INJECTION, POWDER, FOR SOLUTION INTRAMUSCULAR at 13:00

## 2018-08-29 RX ADMIN — INSULIN LISPRO SCH UNITS: 100 INJECTION, SOLUTION INTRAVENOUS; SUBCUTANEOUS at 14:35

## 2018-08-29 RX ADMIN — Medication SCH CAP: at 20:28

## 2018-08-29 RX ADMIN — Medication SCH CAP: at 09:44

## 2018-08-29 RX ADMIN — BACITRACIN SCH MLS/HR: 5000 INJECTION, POWDER, FOR SOLUTION INTRAMUSCULAR at 06:01

## 2018-08-29 RX ADMIN — MORPHINE SULFATE PRN MG: 2 INJECTION, SOLUTION INTRAMUSCULAR; INTRAVENOUS at 20:28

## 2018-08-29 RX ADMIN — HEPARIN SODIUM SCH UNIT: 5000 INJECTION, SOLUTION INTRAVENOUS; SUBCUTANEOUS at 14:36

## 2018-08-29 RX ADMIN — HEPARIN SODIUM SCH UNIT: 5000 INJECTION, SOLUTION INTRAVENOUS; SUBCUTANEOUS at 22:00

## 2018-08-29 RX ADMIN — INSULIN LISPRO SCH UNITS: 100 INJECTION, SOLUTION INTRAVENOUS; SUBCUTANEOUS at 17:40

## 2018-08-29 RX ADMIN — HEPARIN SODIUM SCH UNIT: 5000 INJECTION, SOLUTION INTRAVENOUS; SUBCUTANEOUS at 06:07

## 2018-08-29 RX ADMIN — BACITRACIN SCH MLS/HR: 5000 INJECTION, POWDER, FOR SOLUTION INTRAMUSCULAR at 20:27

## 2018-08-29 NOTE — PDOC
PROGRESS NOTES


Chief Complaint


Chief Complaint


AMS, metabolic and toxic encephalopathy


drug abuse with meth


hyperthermia , resolved


LEV vasomotor


DM2 on insulin


rhabdomyolysis


elevated CE with lev, demanding ischemia


tobaccoism


leukocytosis, SIRS


LACTATE acidosis


hypomagnesemia


down syndrome


schizophrenia


Hyponatremia


Hypokalemia mild


GPC bacteremia 1 out of 2 bottles





History of Present Illness


History of Present Illness


Transferred out of ICU 8/25/18 after an overnight stay


Mentation now back to normal


Patient admits to methamphetamine use


Blisters on bilateral soles of the feet, was wearing sandals. Pictures on chart

, I have reviewed


I have reviewed wounds personally with ID at bedside





CK continues to improve,now down to 700s from 5 digit number on admit


shaking is better with benzos





PLAN:


CONT the antibiotics per ID


Awaiting speciation and sensitivities of the GPC bacteremia


Continue wound care PTOT and IV fluid


Recheck CPK again tomorrow


I would continue IV fluids at current rate


Once patient shifted to by mouth antibiotic and I will discharge, hopefully 

tomorrow





Vitals


Vitals





Vital Signs








  Date Time  Temp Pulse Resp B/P (MAP) Pulse Ox O2 Delivery O2 Flow Rate FiO2


 


8/29/18 09:45   18   Room Air  


 


8/29/18 07:00 97.9 71  125/76 (92) 95   





 97.9       


 


8/28/18 18:25       2.0 











Physical Exam


Physical Exam





GENERAL:  Alert, awake male, in no acute distress, cooperative, appropriate


mood.


HEENT:  Normocephalic, atraumatic, anicteric.  No thrush.  Oropharynx clear and


moist.


NECK:  Supple.


LUNGS:  Clear bilaterally.


HEART:  S1, S2, no murmurs.


ABDOMEN:  Soft, nontender, nondistended.  Bowel sounds present.


EXTREMITIES:  No cyanosis, no clubbing.


PSYCHIATRIC:  Appropriate mood and affect.


SKIN:  Bilateral lower extremity dressing intact, removed, numerous blisters and


superficial abrasions present both soles and anterior leg shin and no evidence


of secondary bacterial infection.


NEUROLOGIC:  Alert and oriented.  Grossly nonfocal, appropriate speech.


General:  Alert, Oriented X3, Cooperative


Heart:  Regular rate, Normal S1, Normal S2


Lungs:  Clear


Abdomen:  Normal bowel sounds, Soft


Skin:  Other (blisters and  redness on bilateral soles of the feet, dressing in 

place)





Labs


LABS





Laboratory Tests








Test


 8/28/18


13:25 8/28/18


14:24 8/28/18


16:54 8/28/18


20:53


 


Vancomycin Level Trough


 11.0 mcg/mL


(10.0-20.0) 


 


 





 


Vancomycin Last Dose Date 08/28/18    


 


Vancomycin Last Dose Time 0200    


 


Glucose (Fingerstick)


 


 192 mg/dL


(70-99) 152 mg/dL


(70-99) 182 mg/dL


(70-99)


 


Test


 8/29/18


07:49 8/29/18


09:25 8/29/18


11:46 





 


Glucose (Fingerstick)


 165 mg/dL


(70-99) 


 176 mg/dL


(70-99) 





 


White Blood Count


 


 6.9 x10^3/uL


(4.0-11.0) 


 





 


Red Blood Count


 


 4.14 x10^6/uL


(4.30-5.70) 


 





 


Hemoglobin


 


 11.9 g/dL


(13.0-17.5) 


 





 


Hematocrit


 


 34.1 %


(39.0-53.0) 


 





 


Mean Corpuscular Volume  82 fL ()   


 


Mean Corpuscular Hemoglobin  29 pg (25-35)   


 


Mean Corpuscular Hemoglobin


Concent 


 35 g/dL


(31-37) 


 





 


Red Cell Distribution Width


 


 15.0 %


(11.5-14.5) 


 





 


Platelet Count


 


 168 x10^3/uL


(140-400) 


 





 


Neutrophils (%) (Auto)  75 % (31-73)   


 


Lymphocytes (%) (Auto)  14 % (24-48)   


 


Monocytes (%) (Auto)  7 % (0-9)   


 


Eosinophils (%) (Auto)  4 % (0-3)   


 


Basophils (%) (Auto)  0 % (0-3)   


 


Neutrophils # (Auto)


 


 5.2 x10^3uL


(1.8-7.7) 


 





 


Lymphocytes # (Auto)


 


 1.0 x10^3/uL


(1.0-4.8) 


 





 


Monocytes # (Auto)


 


 0.5 x10^3/uL


(0.0-1.1) 


 





 


Eosinophils # (Auto)


 


 0.3 x10^3/uL


(0.0-0.7) 


 





 


Basophils # (Auto)


 


 0.0 x10^3/uL


(0.0-0.2) 


 





 


Sodium Level


 


 134 mmol/L


(136-145) 


 





 


Potassium Level


 


 3.3 mmol/L


(3.5-5.1) 


 





 


Chloride Level


 


 104 mmol/L


() 


 





 


Carbon Dioxide Level


 


 25 mmol/L


(21-32) 


 





 


Anion Gap  5 (6-14)   


 


Blood Urea Nitrogen  8 mg/dL (8-26)   


 


Creatinine


 


 0.7 mg/dL


(0.7-1.3) 


 





 


Estimated GFR


(Cockcroft-Gault) 


 123.1 


 


 





 


Glucose Level


 


 220 mg/dL


(70-99) 


 





 


Calcium Level


 


 9.0 mg/dL


(8.5-10.1) 


 





 


Creatine Kinase


 


 715 U/L


() 


 














Review of Systems


Review of Systems


soles of feet hurt otherwise -14 point





Assessment and Plan


Assessmemt and Plan


Problems


Medical Problems:


(1) Acute renal failure


Status: Acute  





(2) Fever 106 degrees F or over


Status: Acute  





(3) Heat exhaustion


Status: Acute  





(4) Methamphetamine abuse


Status: Acute  











Comment


Review of Relevant


I have reviewed the following items karlie (where applicable) has been applied.


Labs





Laboratory Tests








Test


 8/27/18


17:09 8/27/18


20:27 8/28/18


03:10 8/28/18


07:29


 


Glucose (Fingerstick)


 161 mg/dL


(70-99) 153 mg/dL


(70-99) 


 171 mg/dL


(70-99)


 


Erythrocyte Sedimentation Rate   88 (0-15)  


 


Creatinine


 


 


 0.7 mg/dL


(0.7-1.3) 





 


Estimated GFR


(Cockcroft-Gault) 


 


 123.1 


 





 


Creatine Kinase


 


 


 1983 U/L


() 





 


Test


 8/28/18


11:33 8/28/18


13:25 8/28/18


14:24 8/28/18


16:54


 


Glucose (Fingerstick)


 155 mg/dL


(70-99) 


 192 mg/dL


(70-99) 152 mg/dL


(70-99)


 


Vancomycin Level Trough


 


 11.0 mcg/mL


(10.0-20.0) 


 





 


Vancomycin Last Dose Date  08/28/18   


 


Vancomycin Last Dose Time  0200   


 


Test


 8/28/18


20:53 8/29/18


07:49 8/29/18


09:25 8/29/18


11:46


 


Glucose (Fingerstick)


 182 mg/dL


(70-99) 165 mg/dL


(70-99) 


 176 mg/dL


(70-99)


 


White Blood Count


 


 


 6.9 x10^3/uL


(4.0-11.0) 





 


Red Blood Count


 


 


 4.14 x10^6/uL


(4.30-5.70) 





 


Hemoglobin


 


 


 11.9 g/dL


(13.0-17.5) 





 


Hematocrit


 


 


 34.1 %


(39.0-53.0) 





 


Mean Corpuscular Volume   82 fL ()  


 


Mean Corpuscular Hemoglobin   29 pg (25-35)  


 


Mean Corpuscular Hemoglobin


Concent 


 


 35 g/dL


(31-37) 





 


Red Cell Distribution Width


 


 


 15.0 %


(11.5-14.5) 





 


Platelet Count


 


 


 168 x10^3/uL


(140-400) 





 


Neutrophils (%) (Auto)   75 % (31-73)  


 


Lymphocytes (%) (Auto)   14 % (24-48)  


 


Monocytes (%) (Auto)   7 % (0-9)  


 


Eosinophils (%) (Auto)   4 % (0-3)  


 


Basophils (%) (Auto)   0 % (0-3)  


 


Neutrophils # (Auto)


 


 


 5.2 x10^3uL


(1.8-7.7) 





 


Lymphocytes # (Auto)


 


 


 1.0 x10^3/uL


(1.0-4.8) 





 


Monocytes # (Auto)


 


 


 0.5 x10^3/uL


(0.0-1.1) 





 


Eosinophils # (Auto)


 


 


 0.3 x10^3/uL


(0.0-0.7) 





 


Basophils # (Auto)


 


 


 0.0 x10^3/uL


(0.0-0.2) 





 


Sodium Level


 


 


 134 mmol/L


(136-145) 





 


Potassium Level


 


 


 3.3 mmol/L


(3.5-5.1) 





 


Chloride Level


 


 


 104 mmol/L


() 





 


Carbon Dioxide Level


 


 


 25 mmol/L


(21-32) 





 


Anion Gap   5 (6-14)  


 


Blood Urea Nitrogen   8 mg/dL (8-26)  


 


Creatinine


 


 


 0.7 mg/dL


(0.7-1.3) 





 


Estimated GFR


(Cockcroft-Gault) 


 


 123.1 


 





 


Glucose Level


 


 


 220 mg/dL


(70-99) 





 


Calcium Level


 


 


 9.0 mg/dL


(8.5-10.1) 





 


Creatine Kinase


 


 


 715 U/L


() 











Laboratory Tests








Test


 8/28/18


13:25 8/28/18


14:24 8/28/18


16:54 8/28/18


20:53


 


Vancomycin Level Trough


 11.0 mcg/mL


(10.0-20.0) 


 


 





 


Vancomycin Last Dose Date 08/28/18    


 


Vancomycin Last Dose Time 0200    


 


Glucose (Fingerstick)


 


 192 mg/dL


(70-99) 152 mg/dL


(70-99) 182 mg/dL


(70-99)


 


Test


 8/29/18


07:49 8/29/18


09:25 8/29/18


11:46 





 


Glucose (Fingerstick)


 165 mg/dL


(70-99) 


 176 mg/dL


(70-99) 





 


White Blood Count


 


 6.9 x10^3/uL


(4.0-11.0) 


 





 


Red Blood Count


 


 4.14 x10^6/uL


(4.30-5.70) 


 





 


Hemoglobin


 


 11.9 g/dL


(13.0-17.5) 


 





 


Hematocrit


 


 34.1 %


(39.0-53.0) 


 





 


Mean Corpuscular Volume  82 fL ()   


 


Mean Corpuscular Hemoglobin  29 pg (25-35)   


 


Mean Corpuscular Hemoglobin


Concent 


 35 g/dL


(31-37) 


 





 


Red Cell Distribution Width


 


 15.0 %


(11.5-14.5) 


 





 


Platelet Count


 


 168 x10^3/uL


(140-400) 


 





 


Neutrophils (%) (Auto)  75 % (31-73)   


 


Lymphocytes (%) (Auto)  14 % (24-48)   


 


Monocytes (%) (Auto)  7 % (0-9)   


 


Eosinophils (%) (Auto)  4 % (0-3)   


 


Basophils (%) (Auto)  0 % (0-3)   


 


Neutrophils # (Auto)


 


 5.2 x10^3uL


(1.8-7.7) 


 





 


Lymphocytes # (Auto)


 


 1.0 x10^3/uL


(1.0-4.8) 


 





 


Monocytes # (Auto)


 


 0.5 x10^3/uL


(0.0-1.1) 


 





 


Eosinophils # (Auto)


 


 0.3 x10^3/uL


(0.0-0.7) 


 





 


Basophils # (Auto)


 


 0.0 x10^3/uL


(0.0-0.2) 


 





 


Sodium Level


 


 134 mmol/L


(136-145) 


 





 


Potassium Level


 


 3.3 mmol/L


(3.5-5.1) 


 





 


Chloride Level


 


 104 mmol/L


() 


 





 


Carbon Dioxide Level


 


 25 mmol/L


(21-32) 


 





 


Anion Gap  5 (6-14)   


 


Blood Urea Nitrogen  8 mg/dL (8-26)   


 


Creatinine


 


 0.7 mg/dL


(0.7-1.3) 


 





 


Estimated GFR


(Cockcroft-Gault) 


 123.1 


 


 





 


Glucose Level


 


 220 mg/dL


(70-99) 


 





 


Calcium Level


 


 9.0 mg/dL


(8.5-10.1) 


 





 


Creatine Kinase


 


 715 U/L


() 


 











Microbiology


8/25/18 Blood Culture - Preliminary, Resulted


          


8/25/18 Blood Culture Result 1 (HIRAL) - Preliminary, Resulted


Medications





Current Medications


Sodium Chloride 1,000 ml @  1,000 mls/hr Q1H IV  Last administered on 8/25/18at 

04:06;  Start 8/25/18 at 04:00;  Stop 8/25/18 at 04:59;  Status DC


Lorazepam (Ativan) 2 mg 1X  ONCE IV  Last administered on 8/25/18at 04:06;  

Start 8/25/18 at 04:00;  Stop 8/25/18 at 04:05;  Status DC


Lorazepam (Ativan) 2 mg 1X  ONCE IV  Last administered on 8/25/18at 04:23;  

Start 8/25/18 at 04:30;  Stop 8/25/18 at 04:32;  Status DC


Acetaminophen (Tylenol) 1,000 mg 1X  ONCE PO  Last administered on 8/25/18at 05:

00;  Start 8/25/18 at 05:00;  Stop 8/25/18 at 05:01;  Status DC


Lorazepam (Ativan) 2 mg 1X  ONCE IV ;  Start 8/25/18 at 05:00;  Stop 8/25/18 at 

05:01;  Status DC


Ondansetron HCl (Zofran) 4 mg PRN Q8HRS  PRN IV NAUSEA/VOMITING;  Start 8/25/18 

at 05:30;  Stop 8/26/18 at 05:29;  Status DC


Sodium Chloride 1,000 ml @  250 mls/hr Q4H IV  Last administered on 8/25/18at 20

:56;  Start 8/25/18 at 05:18;  Stop 8/26/18 at 05:17;  Status DC


Sodium Chloride 1,000 ml @  1,000 mls/hr 1X  ONCE IV  Last administered on 8/25/ 18at 08:15;  Start 8/25/18 at 08:15;  Stop 8/25/18 at 09:14;  Status DC


Sodium Chloride 1,000 ml @  1,000 mls/hr 1X  ONCE IV  Last administered on 8/25/ 18at 08:15;  Start 8/25/18 at 08:15;  Stop 8/25/18 at 09:14;  Status DC


Insulin Human Lispro (HumaLOG) 0-9 UNITS TIDWMEALS SQ  Last administered on 8/29 /18at 09:49;  Start 8/25/18 at 12:00


Dextrose (Dextrose 50%-Water Syringe) 12.5 gm PRN Q15MIN  PRN IV SEE COMMENTS;  

Start 8/25/18 at 08:15


Nicotine (Nicoderm Cq 21mg) 1 patch PRN DAILY  PRN TD SMOKING CESSATION Last 

administered on 8/27/18at 13:30;  Start 8/25/18 at 11:30


Potassium Chloride (Klor-Con) 40 meq 1X  ONCE PO  Last administered on 8/25/ 18at 12:43;  Start 8/25/18 at 12:00;  Stop 8/25/18 at 12:01;  Status DC


Acetaminophen (Tylenol) 650 mg PRN Q6HRS  PRN PO FEVER;  Start 8/25/18 at 13:15


Ondansetron HCl (Zofran) 4 mg PRN Q6HRS  PRN IV NAUSEA/VOMITING;  Start 8/25/18 

at 13:15


Morphine Sulfate (Morphine Sulfate) 2 mg PRN Q2HR  PRN IV MODERATE TO SEVERE 

PAIN Last administered on 8/28/18at 18:25;  Start 8/25/18 at 13:15


Tramadol HCl (Ultram) 50 mg PRN Q6HRS  PRN PO MILD TO MODERATE PAIN Last 

administered on 8/29/18at 09:45;  Start 8/25/18 at 13:15


Docusate Sodium (Colace) 100 mg PRN DAILY  PRN PO CONSTIPATION;  Start 8/25/18 

at 13:15


Heparin Sodium (Porcine) (Heparin Sq) 5,000 unit Q8HRS SQ  Last administered on 

8/29/18at 06:07;  Start 8/25/18 at 14:00


Insulin Glargine (Lantus) 20 units QHS SQ  Last administered on 8/28/18at 21:10

;  Start 8/25/18 at 21:00


Vancomycin HCl (Vanco Per Pharmacy) 1 each PRN DAILY  PRN MC SEE COMMENTS;  

Start 8/26/18 at 09:00;  Stop 8/26/18 at 09:01;  Status DC


Ceftriaxone Sodium 1 gm/ Dextrose 50 ml @  100 mls/hr Q24H IV ;  Start 8/26/18 

at 09:00;  Stop 8/26/18 at 09:08;  Status DC


Ceftriaxone Sodium (Rocephin) 1 gm Q24H IVP  Last administered on 8/28/18at 11:

02;  Start 8/26/18 at 10:00


Vancomycin HCl (Vanco Per Pharmacy) 1 each PRN DAILY  PRN MC SEE COMMENTS Last 

administered on 8/28/18at 15:49;  Start 8/26/18 at 12:15


Vancomycin HCl 2 gm/Sodium Chloride 500 ml @  250 mls/hr 1X  ONCE IV  Last 

administered on 8/26/18at 13:58;  Start 8/26/18 at 13:00;  Stop 8/26/18 at 14:59

;  Status DC


Sodium Chloride 1,000 ml @  125 mls/hr Q8H IV  Last administered on 8/29/18at 06

:01;  Start 8/26/18 at 13:00


Vancomycin HCl 1.5 gm/Sodium Chloride 500 ml @  250 mls/hr Q12H IV  Last 

administered on 8/28/18at 14:26;  Start 8/27/18 at 02:00;  Stop 8/28/18 at 17:00

;  Status DC


Vancomycin HCl (Vancomycin Trough Level) 1 each 1X  ONCE MC  Last administered 

on 8/28/18at 13:30;  Start 8/28/18 at 13:30;  Stop 8/28/18 at 13:31;  Status DC


Lactobacillus Rhamnosus (Culturelle) 1 cap BID PO  Last administered on 8/29/ 18at 09:44;  Start 8/26/18 at 21:00


Lorazepam (Ativan) 2 mg PRN Q4HRS  PRN IV ANXIETY / AGITATION;  Start 8/27/18 

at 14:15


Alprazolam (Xanax) 0.5 mg PRN Q6HRS  PRN PO ANXIETY / AGITATION Last 

administered on 8/27/18at 19:18;  Start 8/27/18 at 14:15;  Stop 8/28/18 at 11:30

;  Status DC


Alprazolam (Xanax) 1 mg PRN Q6HRS  PRN PO ANXIETY / AGITATION Last administered 

on 8/28/18at 21:04;  Start 8/28/18 at 11:45


Vancomycin HCl 1.25 gm/Sodium Chloride 250 ml @  167 mls/hr Q8H IV  Last 

administered on 8/29/18at 06:02;  Start 8/28/18 at 23:00





Active Scripts


Active


Reported


Novolog (Insulin Aspart) 100 Unit/1 Ml Cartridge 1 Unit SQ PRN BFRMEAL PRN


Lantus Solostar (Insulin Glargine,Hum.rec.anlog) 100 Unit/1 Ml Insuln.pen 1 

Unit SQ PRN PRN


Trazodone Hcl 50 Mg Tablet 1 Tab PO QHS


Simvastatin 20 Mg Tablet 1 Tab PO QHS


Olanzapine 10 Mg Tablet 1.5 Tab PO QHS


Niaspan (Niacin) 500 Mg Tab.er.24h 1 Tab PO BID


Gemfibrozil 600 Mg Tablet 1 Tab PO BIDWMEALS


Metformin Hcl 1,000 Mg Tablet 1,000 Mg PO BIDWMEALS


Citalopram Hbr (Citalopram Hydrobromide) 40 Mg Tablet 1 Tab PO DAILY


Vitals/I & O





Vital Sign - Last 24 Hours








 8/28/18 8/28/18 8/28/18 8/28/18





 15:09 18:25 19:05 19:53


 


Temp 97.5  98.5 





 97.5  98.5 


 


Pulse 78  69 


 


Resp 18  18 


 


B/P (MAP) 108/74 (85)  120/77 (91) 


 


Pulse Ox 96 96 97 


 


O2 Delivery Room Air Room Air Room Air Room Air


 


O2 Flow Rate  2.0  


 


    





    





 8/28/18 8/28/18 8/29/18 8/29/18





 20:03 23:05 03:05 07:00


 


Temp  98.0 98.0 97.9





  98.0 98.0 97.9


 


Pulse  69 68 71


 


Resp  18 18 16


 


B/P (MAP)  101/69 (80) 92/58 (69) 125/76 (92)


 


Pulse Ox  96 95 95


 


O2 Delivery Room Air Room Air Room Air Room Air


 


    





    





 8/29/18   





 09:45   


 


Resp 18   


 


O2 Delivery Room Air   














Intake and Output   


 


 8/28/18 8/28/18 8/29/18





 15:00 23:00 07:00


 


Intake Total  740 ml 350 ml


 


Output Total  975 ml 2000 ml


 


Balance  -235 ml -1650 ml

















PARTH OVERTON MD Aug 29, 2018 11:58

## 2018-08-29 NOTE — PDOC
Infectious Disease Note


Subjective:


Subjective


pt says feels ok


no f/c/n/v/d





ROS:


ROS


Negative except for above.





Vital Signs:


Vital Signs





Vital Signs








  Date Time  Temp Pulse Resp B/P (MAP) Pulse Ox O2 Delivery O2 Flow Rate FiO2


 


18 11:00 97.9 70 20 108/77 (87) 94 Room Air  





 97.9       


 


18 18:25       2.0 











Physical Exam:


PHYSICAL EXAM





GENERAL:  Alert, awake male, in no acute distress, cooperative, appropriate


mood.


HEENT:  Normocephalic, atraumatic, anicteric.  No thrush.  Oropharynx clear and


moist.


NECK:  Supple.


LUNGS:  Clear bilaterally.


HEART:  S1, S2, no murmurs.


ABDOMEN:  Soft, nontender, nondistended.  Bowel sounds present.


EXTREMITIES:  No cyanosis, no clubbing.


PSYCHIATRIC:  Appropriate mood and affect.


SKIN:  Bilateral lower extremity dressing intact, removed, numerous blisters and


superficial abrasions present both soles and anterior leg shin and no evidence


of secondary bacterial infection.


NEUROLOGIC:  Alert and oriented.  Grossly nonfocal, appropriate speech.





Medications:


Inpatient Meds:





Current Medications








 Medications


  (Trade)  Dose


 Ordered  Sig/Roxy  Start Time


 Stop Time Status Last Admin


Dose Admin


 


 Acetaminophen


  (Tylenol)  650 mg  PRN Q6HRS  PRN  18 13:15


     





 


 Alprazolam


  (Xanax)  1 mg  PRN Q6HRS  PRN  18 11:45


    18 21:04


1 MG


 


 Ceftriaxone


 Sodium 1 gm/


 Dextrose  50 ml @ 


 100 mls/hr  Q24H  18 09:00


 18 09:08 DC  





 


 Ceftriaxone Sodium


  (Rocephin)  1 gm  Q24H  18 10:00


    18 11:02


1 GM


 


 Dextrose


  (Dextrose


 50%-Water Syringe)  12.5 gm  PRN Q15MIN  PRN  18 08:15


     





 


 Docusate Sodium


  (Colace)  100 mg  PRN DAILY  PRN  18 13:15


     





 


 Heparin Sodium


  (Porcine)


  (Heparin Sq)  5,000 unit  Q8HRS  18 14:00


    18 06:07


5,000 UNIT


 


 Insulin Glargine


  (Lantus)  20 units  QHS  18 21:00


    18 21:10


20 UNITS


 


 Insulin Human


 Lispro


  (HumaLOG)  0-9 UNITS  TIDWMEALS  18 12:00


    18 09:49


4 UNITS


 


 Lactobacillus


 Rhamnosus


  (Culturelle)  1 cap  BID  18 21:00


    18 09:44


1 CAP


 


 Lorazepam


  (Ativan)  2 mg  PRN Q4HRS  PRN  18 14:15


     





 


 Morphine Sulfate


  (Morphine


 Sulfate)  2 mg  PRN Q2HR  PRN  18 13:15


    18 18:25


2 MG


 


 Nicotine


  (Nicoderm Cq


 21mg)  1 patch  PRN DAILY  PRN  18 11:30


    18 13:30


1 PATCH


 


 Ondansetron HCl


  (Zofran)  4 mg  PRN Q6HRS  PRN  18 13:15


     





 


 Potassium Chloride


  (Klor-Con)  40 meq  1X  ONCE  18 12:00


 18 12:01 DC 18 12:43


40 MEQ


 


 Sodium Chloride  1,000 ml @ 


 125 mls/hr  Q8H  18 13:00


    18 06:01


125 MLS/HR


 


 Tramadol HCl


  (Ultram)  50 mg  PRN Q6HRS  PRN  18 13:15


    18 09:45


50 MG


 


 Vancomycin HCl


  (Vanco Per


 Pharmacy)  1 each  PRN DAILY  PRN  18 12:15


    18 15:49


1 EACH


 


 Vancomycin HCl


  (Vancomycin


 Trough Level)  1 each  1X  ONCE  18 13:30


 18 13:31 DC 18 13:30


1 EACH


 


 Vancomycin HCl


 1.25 gm/Sodium


 Chloride  250 ml @ 


 167 mls/hr  Q8H  18 23:00


    18 06:02


167 MLS/HR


 


 Vancomycin HCl


 1.5 gm/Sodium


 Chloride  500 ml @ 


 250 mls/hr  Q12H  18 02:00


 18 17:00 DC 18 14:26


250 MLS/HR


 


 Vancomycin HCl 2


 gm/Sodium Chloride  500 ml @ 


 250 mls/hr  1X  ONCE  18 13:00


 18 14:59 DC 18 13:58


250 MLS/HR











Labs:


Lab





Laboratory Tests








Test


 18


13:25 18


14:24 18


16:54 18


20:53


 


Vancomycin Level Trough


 11.0 mcg/mL


(10.0-20.0) 


 


 





 


Vancomycin Last Dose Date 18    


 


Vancomycin Last Dose Time 0200    


 


Glucose (Fingerstick)


 


 192 mg/dL


(70-99) 152 mg/dL


(70-99) 182 mg/dL


(70-99)


 


Test


 18


07:49 18


09:25 18


11:46 





 


Glucose (Fingerstick)


 165 mg/dL


(70-99) 


 176 mg/dL


(70-99) 





 


White Blood Count


 


 6.9 x10^3/uL


(4.0-11.0) 


 





 


Red Blood Count


 


 4.14 x10^6/uL


(4.30-5.70) 


 





 


Hemoglobin


 


 11.9 g/dL


(13.0-17.5) 


 





 


Hematocrit


 


 34.1 %


(39.0-53.0) 


 





 


Mean Corpuscular Volume  82 fL ()   


 


Mean Corpuscular Hemoglobin  29 pg (25-35)   


 


Mean Corpuscular Hemoglobin


Concent 


 35 g/dL


(31-37) 


 





 


Red Cell Distribution Width


 


 15.0 %


(11.5-14.5) 


 





 


Platelet Count


 


 168 x10^3/uL


(140-400) 


 





 


Neutrophils (%) (Auto)  75 % (31-73)   


 


Lymphocytes (%) (Auto)  14 % (24-48)   


 


Monocytes (%) (Auto)  7 % (0-9)   


 


Eosinophils (%) (Auto)  4 % (0-3)   


 


Basophils (%) (Auto)  0 % (0-3)   


 


Neutrophils # (Auto)


 


 5.2 x10^3uL


(1.8-7.7) 


 





 


Lymphocytes # (Auto)


 


 1.0 x10^3/uL


(1.0-4.8) 


 





 


Monocytes # (Auto)


 


 0.5 x10^3/uL


(0.0-1.1) 


 





 


Eosinophils # (Auto)


 


 0.3 x10^3/uL


(0.0-0.7) 


 





 


Basophils # (Auto)


 


 0.0 x10^3/uL


(0.0-0.2) 


 





 


Sodium Level


 


 134 mmol/L


(136-145) 


 





 


Potassium Level


 


 3.3 mmol/L


(3.5-5.1) 


 





 


Chloride Level


 


 104 mmol/L


() 


 





 


Carbon Dioxide Level


 


 25 mmol/L


(21-32) 


 





 


Anion Gap  5 (6-14)   


 


Blood Urea Nitrogen  8 mg/dL (8-26)   


 


Creatinine


 


 0.7 mg/dL


(0.7-1.3) 


 





 


Estimated GFR


(Cockcroft-Gault) 


 123.1 


 


 





 


Glucose Level


 


 220 mg/dL


(70-99) 


 





 


Calcium Level


 


 9.0 mg/dL


(8.5-10.1) 


 





 


Creatine Kinase


 


 715 U/L


() 


 











Micro


Aug 25 2018 





RUN DATE: 18                                                             

    PAGE 1   


RUN TIME: 1693


                          [facility]


                       [address] [place] [address]


                        William Segundo M.D., Medical Director





--------------------------------------------------------------------------------

------------





PATIENT: LUCRECIA MÉNDEZ M                  ACCT: [id number] LOC:  [address] [place] [address]      U

: [id number]


                                       AGE/SX: 42/M         ROOM: [facility]        REG

: 18


REG DR:  PARTH OVERTON MD          :    1975   BED:  1          DIS

:         


                                       STATUS: ADM IN       TLOC:           


--------------------------------------------------------------------------------

------------








SPEC #: [id number]       PEARL:      STATUS:  RES            REQ 

#: [id number]


                            RECD: [address] [place] [address] DR: PARTH OVERTON MD          


SOURCE: BLOOD               ENTR:      MARISOL DR: TITA WOODARD MD      


[facility]:                                                      RAFAEL DUMONT MD   

            


ORDERED:  BLD CULT - LC                                                        

   


--------------------------------------------------------------------------------

------------





  Procedure                         Result                                     

           


--------------------------------------------------------------------------------

------------





  BLOOD CULTURE LC  Preliminary  


        Preliminary report





  BLD CULT RESULT 1  Preliminary  


        Streptococcus species





        Performed at:   - [facility]


        [address], [place], [place]  [id number]


        : ZAFAR Maria MD, Phone:  [phone]





--------------------------------------------------------------------------------

------------





Objective:


Assessment:





1.  Fever, resolved.


2.  Altered mental status, metabolic and toxic encephalopathy improving.  CT


negative for acute changes, though limited.


3.  Drug abuse with methamphetamine.


4.  Strep bacteremia Blood culture 1 out of 2 bottles positive;-ID and HIRAL 

pending at


this time.


5.  Hyperthermia, likely from heat exhaustion, resolved.


6.  Numerous blisters both lower extremities, could be the source for GPC,


improving.


7.  Acute kidney injury with rhabdo.improving


8.  Borderline elevation of liver function tests from rhabdo.


9.  Diabetes mellitus 2.


10.  Tobaccoism.


11.  Lactic acidosis, resolved.


12.  Hypomagnesemia.


13.  Down syndrome.


14.  Schizophrenia.


15.  Hyponatremia, hypokalemia, hypomagnesemia, resolved.


16.  Elevated troponin and CK.  No clear cardiac etiology.  Echo is within


normal limits.





Plan:


Plan of Care





1.  Continue  Rocephin.DC IV Vanc


2.  Continue local wound care for both lower extremities.


3.  check urine strep ag


4.  Continue supportive care.











CALLI GASTELUM MD Aug 29, 2018 12:51

## 2018-08-30 VITALS — DIASTOLIC BLOOD PRESSURE: 76 MMHG | SYSTOLIC BLOOD PRESSURE: 119 MMHG

## 2018-08-30 VITALS — DIASTOLIC BLOOD PRESSURE: 68 MMHG | SYSTOLIC BLOOD PRESSURE: 95 MMHG

## 2018-08-30 VITALS — SYSTOLIC BLOOD PRESSURE: 118 MMHG | DIASTOLIC BLOOD PRESSURE: 74 MMHG

## 2018-08-30 VITALS — DIASTOLIC BLOOD PRESSURE: 57 MMHG | SYSTOLIC BLOOD PRESSURE: 97 MMHG

## 2018-08-30 RX ADMIN — BACITRACIN SCH MLS/HR: 5000 INJECTION, POWDER, FOR SOLUTION INTRAMUSCULAR at 13:00

## 2018-08-30 RX ADMIN — CEFTRIAXONE SCH GM: 1 INJECTION, POWDER, FOR SOLUTION INTRAMUSCULAR; INTRAVENOUS at 11:20

## 2018-08-30 RX ADMIN — INSULIN LISPRO SCH UNITS: 100 INJECTION, SOLUTION INTRAVENOUS; SUBCUTANEOUS at 08:00

## 2018-08-30 RX ADMIN — INSULIN LISPRO SCH UNITS: 100 INJECTION, SOLUTION INTRAVENOUS; SUBCUTANEOUS at 12:00

## 2018-08-30 RX ADMIN — INSULIN LISPRO SCH UNITS: 100 INJECTION, SOLUTION INTRAVENOUS; SUBCUTANEOUS at 17:17

## 2018-08-30 RX ADMIN — HEPARIN SODIUM SCH UNIT: 5000 INJECTION, SOLUTION INTRAVENOUS; SUBCUTANEOUS at 14:00

## 2018-08-30 RX ADMIN — BACITRACIN SCH MLS/HR: 5000 INJECTION, POWDER, FOR SOLUTION INTRAMUSCULAR at 04:14

## 2018-08-30 RX ADMIN — HEPARIN SODIUM SCH UNIT: 5000 INJECTION, SOLUTION INTRAVENOUS; SUBCUTANEOUS at 06:42

## 2018-08-30 RX ADMIN — Medication SCH CAP: at 09:04

## 2018-08-30 NOTE — PDOC
Infectious Disease Note


Subjective:


Subjective


pt says feels ok


no f/c/n/v/d


foot and leg lesions are improving





ROS:


ROS


Negative except for above.





Vital Signs:


Vital Signs





Vital Signs








  Date Time  Temp Pulse Resp B/P (MAP) Pulse Ox O2 Delivery O2 Flow Rate FiO2


 


18 07:49 97.5 67 18 95/68 (77) 93 Room Air  





 97.5       


 


18 14:22       2.0 











Physical Exam:


PHYSICAL EXAM





GENERAL:  Alert, awake male, in no acute distress, cooperative, appropriate


mood.


HEENT:  Normocephalic, atraumatic, anicteric.  No thrush.  Oropharynx clear and


moist.


NECK:  Supple.


LUNGS:  Clear bilaterally.


HEART:  S1, S2, no murmurs.


ABDOMEN:  Soft, nontender, nondistended.  Bowel sounds present.


EXTREMITIES:  No cyanosis, no clubbing.


PSYCHIATRIC:  Appropriate mood and affect.


SKIN:  Bilateral lower extremity dressing intact, removed, numerous blisters and


superficial abrasions present both soles and anterior leg shin and no evidence


of secondary bacterial infection.


NEUROLOGIC:  Alert and oriented.  Grossly nonfocal, appropriate speech.





Medications:


Inpatient Meds:





Current Medications








 Medications


  (Trade)  Dose


 Ordered  Sig/Roxy  Start Time


 Stop Time Status Last Admin


Dose Admin


 


 Acetaminophen


  (Tylenol)  650 mg  PRN Q6HRS  PRN  18 13:15


     





 


 Alprazolam


  (Xanax)  1 mg  PRN Q6HRS  PRN  18 11:45


    18 20:27


1 MG


 


 Ceftriaxone


 Sodium 1 gm/


 Dextrose  50 ml @ 


 100 mls/hr  Q24H  18 09:00


 18 09:08 DC  





 


 Ceftriaxone Sodium


  (Rocephin)  1 gm  Q24H  18 10:00


    18 14:21


1 GM


 


 Dextrose


  (Dextrose


 50%-Water Syringe)  12.5 gm  PRN Q15MIN  PRN  18 08:15


     





 


 Docusate Sodium


  (Colace)  100 mg  PRN DAILY  PRN  18 13:15


     





 


 Heparin Sodium


  (Porcine)


  (Heparin Sq)  5,000 unit  Q8HRS  18 14:00


    18 06:42


5,000 UNIT


 


 Insulin Glargine


  (Lantus)  20 units  QHS  18 21:00


    18 20:34


20 UNITS


 


 Insulin Human


 Lispro


  (HumaLOG)  0-9 UNITS  TIDWMEALS  18 12:00


    18 17:40


4 UNITS


 


 Lactobacillus


 Rhamnosus


  (Culturelle)  1 cap  BID  18 21:00


    18 09:04


1 CAP


 


 Lorazepam


  (Ativan)  2 mg  PRN Q4HRS  PRN  18 14:15


     





 


 Morphine Sulfate


  (Morphine


 Sulfate)  2 mg  PRN Q2HR  PRN  18 13:15


    18 20:28


2 MG


 


 Nicotine


  (Nicoderm Cq


 21mg)  1 patch  PRN DAILY  PRN  18 11:30


    18 13:30


1 PATCH


 


 Ondansetron HCl


  (Zofran)  4 mg  PRN Q6HRS  PRN  18 13:15


     





 


 Potassium Chloride


  (Klor-Con)  40 meq  1X  ONCE  18 12:00


 18 12:01 DC 18 12:43


40 MEQ


 


 Sodium Chloride  1,000 ml @ 


 125 mls/hr  Q8H  18 13:00


    18 04:14


125 MLS/HR


 


 Tramadol HCl


  (Ultram)  50 mg  PRN Q6HRS  PRN  18 13:15


    18 09:45


50 MG


 


 Vancomycin HCl


  (Vanco Per


 Pharmacy)  1 each  PRN DAILY  PRN  18 12:15


 18 12:50 DC 18 15:49


1 EACH


 


 Vancomycin HCl


  (Vancomycin


 Trough Level)  1 each  1X  ONCE  18 13:30


 18 13:31 DC 18 13:30


1 EACH


 


 Vancomycin HCl


 1.25 gm/Sodium


 Chloride  250 ml @ 


 167 mls/hr  Q8H  18 23:00


 18 12:50 DC 18 06:02


167 MLS/HR


 


 Vancomycin HCl


 1.5 gm/Sodium


 Chloride  500 ml @ 


 250 mls/hr  Q12H  18 02:00


 18 17:00 DC 18 14:26


250 MLS/HR


 


 Vancomycin HCl 2


 gm/Sodium Chloride  500 ml @ 


 250 mls/hr  1X  ONCE  18 13:00


 18 14:59 DC 18 13:58


250 MLS/HR











Labs:


Lab





Laboratory Tests








Test


 18


11:46 18


17:04 18


21:44 18


03:45


 


Glucose (Fingerstick)


 176 mg/dL


(70-99) 181 mg/dL


(70-99) 128 mg/dL


(70-99) 





 


Creatine Kinase


 


 


 


 334 U/L


()


 


Test


 18


07:20 


 


 





 


Glucose (Fingerstick)


 145 mg/dL


(70-99) 


 


 











Micro





RUN DATE: 18                                                             

    PAGE 1   


RUN TIME: 


                          [facility]


                       [address] [place] [address]


                        William Segundo M.D., Medical Director





--------------------------------------------------------------------------------

------------





PATIENT: LUCRECIA MÉNDEZ                  ACCT: [id number] LOC:  [address] [place] [address]      U

: [id number]


                                       AGE/SX: 42/M         ROOM: [facility]        REG

: 18


REG DR:  PARTH OVERTON MD          :    1975   BED:  1          DIS

:         


                                       STATUS: ADM IN       TLOC:           


--------------------------------------------------------------------------------

------------








SPEC #: [id number]       PEARL:      STATUS:  COMP           REQ 

#: [id number]


                            RECD: [address] [place] [address] DR: PARTH OVERTON MD          


SOURCE: BLOOD               ENTR:      [facility] DR: TITA WOODARD MD      


[facility]:                                                      RAFAEL DUMONT MD   

            


ORDERED:  BLD CULT - LC                                                        

   


--------------------------------------------------------------------------------

------------





  Procedure                         Result                                     

           


--------------------------------------------------------------------------------

------------





  BLOOD CULTURE LC  Final  


        Final report





  BLD CULT RESULT 1  Final  


        Comment





        Streptococcus parasanguinis





  ANTIMICROBIAL SUSCEPTIBILITY  Final  


        Comment





              ** S = Susceptible; I = Intermediate; R = Resistant **


                           P = Positive; N = Negative


                    MICS are expressed in micrograms per mL


           Antibiotic                 RSLT#1    RSLT#2    RSLT#3    RSLT#4


        Ceftriaxone                    S<=0.25


        Chloramphenicol                S =4


        Clindamycin                    S<=0.06


        Erythromycin                   R>=0.5


        Penicillin                     S =0.12


        Vancomycin                     S =0.5


        Performed at:   - [facility], [place], [place]  [id number]


        : ZAFAR Maria MD, Phone:  [phone]





--------------------------------------------------------------------------------

------------





Objective:


Assessment:





1.  Fever, resolved.


2.  Altered mental status, metabolic and toxic encephalopathy improving.  CT


negative for acute changes, though limited.


3.  Drug abuse with methamphetamine.


4.  Strep parasanginous bacteremia 1/2 bottles, source ?


5.  Hyperthermia, likely from heat exhaustion, resolved.


6.  Numerous blisters both lower extremities, could be the source for GPC,


improving.


7.  Acute kidney injury with rhabdo.improving


8.  Borderline elevation of liver function tests from rhabdo.


9.  Diabetes mellitus 2.


10.  Tobaccoism.


11.  Lactic acidosis, resolved.


12.  Hypomagnesemia.


13.  Down syndrome.


14.  Schizophrenia.


15.  Hyponatremia, hypokalemia, hypomagnesemia, resolved.


16.  Elevated troponin and CK.  No clear cardiac etiology.  Echo is within


normal limits.





Plan:


Plan of Care


DC Rocephin on discharge


Augmentin for 10 more days


local foot wound care,


f/u with pcp











CALLI GASTELUM MD Aug 30, 2018 10:02

## 2018-08-30 NOTE — PDOC3
Discharge Summary


Visit Information


Date of Admission:  Aug 25, 2018


Date of Discharge:  Aug 30, 2018


Admitting Diagnosis Comment:


Strep bacteremia


AMS, metabolic and toxic encephalopathy - resolved


drug abuse with meth


hyperthermia , resolved


LEV vasomotor


DM2 on insulin


rhabdomyolysis


elevated CE with lev, demanding ischemia


tobaccoism


leukocytosis, SIRS


LACTATE acidosis


hypomagnesemia


down syndrome


schizophrenia


Hyponatremia


Hypokalemia mild


GPC bacteremia 1 out of 2 bottles


Final Diagnosis


Problems


Medical Problems:


(1) Acute renal failure


Status: Acute  





(2) Fever 106 degrees F or over


Status: Acute  





(3) Heat exhaustion


Status: Acute  





(4) Methamphetamine abuse


Status: Acute  











Brief Hospital Course


Allergies





 Allergies








Coded Allergies Type Severity Reaction Last Updated Verified


 


  No Known Drug Allergies    8/25/18 No








Vital Signs





Vital Signs








  Date Time  Temp Pulse Resp B/P (MAP) Pulse Ox O2 Delivery O2 Flow Rate FiO2


 


8/30/18 11:43 97.5 66 18 119/76 (90) 97 Room Air  





 97.5       


 


8/30/18 08:00       2.0 








Lab Results





Laboratory Tests








Test


 8/28/18


13:25 8/28/18


14:24 8/28/18


16:54 8/28/18


20:53


 


Vancomycin Level Trough


 11.0 mcg/mL


(10.0-20.0) 


 


 





 


Vancomycin Last Dose Date 08/28/18    


 


Vancomycin Last Dose Time 0200    


 


Glucose (Fingerstick)


 


 192 mg/dL


(70-99) 152 mg/dL


(70-99) 182 mg/dL


(70-99)


 


Test


 8/29/18


07:49 8/29/18


09:25 8/29/18


11:46 8/29/18


17:04


 


Glucose (Fingerstick)


 165 mg/dL


(70-99) 


 176 mg/dL


(70-99) 181 mg/dL


(70-99)


 


White Blood Count


 


 6.9 x10^3/uL


(4.0-11.0) 


 





 


Red Blood Count


 


 4.14 x10^6/uL


(4.30-5.70) 


 





 


Hemoglobin


 


 11.9 g/dL


(13.0-17.5) 


 





 


Hematocrit


 


 34.1 %


(39.0-53.0) 


 





 


Mean Corpuscular Volume  82 fL ()   


 


Mean Corpuscular Hemoglobin  29 pg (25-35)   


 


Mean Corpuscular Hemoglobin


Concent 


 35 g/dL


(31-37) 


 





 


Red Cell Distribution Width


 


 15.0 %


(11.5-14.5) 


 





 


Platelet Count


 


 168 x10^3/uL


(140-400) 


 





 


Neutrophils (%) (Auto)  75 % (31-73)   


 


Lymphocytes (%) (Auto)  14 % (24-48)   


 


Monocytes (%) (Auto)  7 % (0-9)   


 


Eosinophils (%) (Auto)  4 % (0-3)   


 


Basophils (%) (Auto)  0 % (0-3)   


 


Neutrophils # (Auto)


 


 5.2 x10^3uL


(1.8-7.7) 


 





 


Lymphocytes # (Auto)


 


 1.0 x10^3/uL


(1.0-4.8) 


 





 


Monocytes # (Auto)


 


 0.5 x10^3/uL


(0.0-1.1) 


 





 


Eosinophils # (Auto)


 


 0.3 x10^3/uL


(0.0-0.7) 


 





 


Basophils # (Auto)


 


 0.0 x10^3/uL


(0.0-0.2) 


 





 


Sodium Level


 


 134 mmol/L


(136-145) 


 





 


Potassium Level


 


 3.3 mmol/L


(3.5-5.1) 


 





 


Chloride Level


 


 104 mmol/L


() 


 





 


Carbon Dioxide Level


 


 25 mmol/L


(21-32) 


 





 


Anion Gap  5 (6-14)   


 


Blood Urea Nitrogen  8 mg/dL (8-26)   


 


Creatinine


 


 0.7 mg/dL


(0.7-1.3) 


 





 


Estimated GFR


(Cockcroft-Gault) 


 123.1 


 


 





 


Glucose Level


 


 220 mg/dL


(70-99) 


 





 


Calcium Level


 


 9.0 mg/dL


(8.5-10.1) 


 





 


Creatine Kinase


 


 715 U/L


() 


 





 


Test


 8/29/18


21:44 8/30/18


03:45 8/30/18


07:20 8/30/18


11:10


 


Glucose (Fingerstick)


 128 mg/dL


(70-99) 


 145 mg/dL


(70-99) 161 mg/dL


(70-99)


 


Creatine Kinase


 


 334 U/L


() 


 











Laboratory Tests








Test


 8/29/18


17:04 8/29/18


21:44 8/30/18


03:45 8/30/18


07:20


 


Glucose (Fingerstick)


 181 mg/dL


(70-99) 128 mg/dL


(70-99) 


 145 mg/dL


(70-99)


 


Creatine Kinase


 


 


 334 U/L


() 





 


Test


 8/30/18


11:10 


 


 





 


Glucose (Fingerstick)


 161 mg/dL


(70-99) 


 


 











Brief Hospital Course


Mr. Mcnally  is a 42 old  male who was pretty high on drugs and 

admitted. He was walking BAREFOOTED and he had scabs and blisters on his 

bilateral soles of feet from walking miles bare footed. He did test positive 

for amphetamines and other uppers. He was very confused and admitted overnight 

to the ICU and was able to transfer to the sixth floor after on the second 

hospital day. But course remarkable for developing tremors. And shakiness. He 

does smoke cigarettes but no alcohol drinking. Needed some Ativan and Xanax 

pretty hefty doses because of withdrawal from whatever recreational drugs he 

took. Also developed/had GPC bacteremia in 1 out of 2 bottles comanage with ID, 

grew Streptococcus. WBC has come down to normal from 20,000 on admission. Also 

had element of rhabdo on admission from  [facility] at 5 digit number now is down to 

300s on discharge. Feeling well with no more shakes. We are Rxing Augmentin for 

10 more days and some Xanax that I have prescribed in chart. The Streptococcus 

is penicillin susceptible.


COnsults performed - ID, renal


Procedures performed-  none





Spent discharging 35 minutes greater than 50% writing all the scripts that 

needed, I doubt that he has been diabetes insulin shots etc. and counseling 

about his rec drug use - he claims he wont do it again





Discharge Information


Condition at Discharge:  Improved, Stable


Disposition/Orders:  D/C to Home


Scheduled


Alprazolam (Xanax) 0.5 Mg Tablet, 1 TAB PO TID, #90


   Prescribed by: PARTH OVERTON on 8/30/18 1227


Amoxicillin/Potassium Clav (Augmentin 500-125 Tablet) 1 Each Tablet, 1 TAB PO 

BID, #20


   Prescribed by: PARTH OVERTON on 8/30/18 1227


Citalopram Hydrobromide (Citalopram Hbr) 40 Mg Tablet, 1 TAB PO DAILY, #90 Ref 1

 (Reported)


   Entered as Reported by: EZRA HAMMOND on 8/26/18 1509


   Last Taken: Unknown Dose on Unknown Date & Time     Last Action: Reviewed on 

8/26/18 1510 by EZRA HAMMOND


Gemfibrozil (Gemfibrozil) 600 Mg Tablet, 1 TAB PO BIDWMEALS, #60 Ref 5 (Reported

)


   Entered as Reported by: EZRA HAMMOND on 8/26/18 1509


   Last Taken: Unknown Dose on Unknown Date & Time     Last Action: Reviewed on 

8/26/18 1510 by EZRA HAMMOND


Metformin Hcl (Metformin Hcl) 1,000 Mg Tablet, 1,000 MG PO BIDWMEALS, (Reported)


   Entered as Reported by: EZRA HAMMOND on 8/26/18 1509


   Last Taken: Unknown Dose on Unknown Date & Time     Last Action: Reviewed on 

8/26/18 1510 by EZRA HAMMOND


Niacin (Niaspan) 500 Mg Tab.er.24h, 1 TAB PO BID, #30 Ref 5 (Reported)


   Entered as Reported by: EZRA HAMMOND on 8/26/18 1509


   Last Taken: Unknown Dose on Unknown Date & Time     Last Action: Reviewed on 

8/26/18 1510 by EZRA HAMMOND


Olanzapine (Olanzapine) 10 Mg Tablet, 1.5 TAB PO QHS, #30 (Reported)


   Entered as Reported by: EZRA HAMMOND on 8/26/18 1509


   Last Taken: Unknown Dose on Unknown Date & Time     Last Action: Reviewed on 

8/26/18 1510 by EZRA HAMMOND


Simvastatin (Simvastatin) 20 Mg Tablet, 1 TAB PO QHS, #30 Ref 5 (Reported)


   Entered as Reported by: EZRA HAMMOND on 8/26/18 1509


   Last Taken: UNKNOWN on Unknown Date & Time     Last Action: Reviewed on 8/26/ 18 1510 by EZRA HAMMOND


Trazodone Hcl (Trazodone Hcl) 50 Mg Tablet, 1 TAB PO QHS, #30 Ref 1 (Reported)


   Entered as Reported by: EZRA HAMMOND on 8/26/18 1509


   Last Taken: UNKNOWN on Unknown Date & Time     Last Action: Reviewed on 8/26/ 18 1510 by EZRA HAMMOND





Scheduled PRN


Insulin Aspart (Novolog) 100 Unit/1 Ml Cartridge, 1 UNIT SQ PRN BFRMEAL PRN for 

SEE COMMENTS, (Reported)


   Entered as Reported by: EZRA HAMMOND on 8/26/18 1509


   Last Action: Reviewed on 8/26/18 1510 by EZRA HAMMOND


Insulin Glargine,Hum.rec.anlog (Lantus Solostar) 100 Unit/1 Ml Insuln.pen, 1 

UNIT SQ PRN PRN for SEE COMMENTS, (Reported)


   Entered as Reported by: EZRA HAMMOND on 8/26/18 1509


   Last Action: Reviewed on 8/26/18 1510 by PARTH MYERS MD Aug 30, 2018 12:32